# Patient Record
Sex: FEMALE | Race: WHITE | NOT HISPANIC OR LATINO | Employment: OTHER | URBAN - NONMETROPOLITAN AREA
[De-identification: names, ages, dates, MRNs, and addresses within clinical notes are randomized per-mention and may not be internally consistent; named-entity substitution may affect disease eponyms.]

---

## 2017-01-31 ENCOUNTER — HOSPITAL ENCOUNTER (EMERGENCY)
Facility: HOSPITAL | Age: 67
Discharge: HOME | End: 2017-01-31
Attending: FAMILY MEDICINE | Admitting: EMERGENCY MEDICINE

## 2017-01-31 ENCOUNTER — APPOINTMENT (OUTPATIENT)
Dept: CT IMAGING | Facility: HOSPITAL | Age: 67
End: 2017-01-31

## 2017-01-31 ENCOUNTER — APPOINTMENT (OUTPATIENT)
Dept: GENERAL RADIOLOGY | Facility: HOSPITAL | Age: 67
End: 2017-01-31
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
## 2017-01-31 DIAGNOSIS — S62.102A: Primary | ICD-10-CM

## 2017-01-31 PROCEDURE — 25010000002 MORPHINE PER 10 MG: Performed by: EMERGENCY MEDICINE

## 2017-01-31 PROCEDURE — 70450 CT HEAD/BRAIN W/O DYE: CPT

## 2017-01-31 PROCEDURE — 99284 EMERGENCY DEPT VISIT MOD MDM: CPT

## 2017-01-31 PROCEDURE — 96374 THER/PROPH/DIAG INJ IV PUSH: CPT

## 2017-01-31 PROCEDURE — 73110 X-RAY EXAM OF WRIST: CPT | Performed by: RADIOLOGY

## 2017-01-31 PROCEDURE — 71101 X-RAY EXAM UNILAT RIBS/CHEST: CPT

## 2017-01-31 PROCEDURE — 70450 CT HEAD/BRAIN W/O DYE: CPT | Performed by: RADIOLOGY

## 2017-01-31 PROCEDURE — 73110 X-RAY EXAM OF WRIST: CPT

## 2017-01-31 PROCEDURE — 25010000002 ONDANSETRON PER 1 MG: Performed by: EMERGENCY MEDICINE

## 2017-01-31 PROCEDURE — 72125 CT NECK SPINE W/O DYE: CPT

## 2017-01-31 PROCEDURE — 96375 TX/PRO/DX INJ NEW DRUG ADDON: CPT

## 2017-01-31 PROCEDURE — 72125 CT NECK SPINE W/O DYE: CPT | Performed by: RADIOLOGY

## 2017-01-31 PROCEDURE — 71101 X-RAY EXAM UNILAT RIBS/CHEST: CPT | Performed by: RADIOLOGY

## 2017-01-31 RX ORDER — HYDROCODONE BITARTRATE AND ACETAMINOPHEN 7.5; 325 MG/1; MG/1
1 TABLET ORAL EVERY 6 HOURS PRN
Qty: 15 TABLET | Refills: 0 | OUTPATIENT
Start: 2017-01-31 | End: 2019-06-03

## 2017-01-31 RX ORDER — SODIUM CHLORIDE 0.9 % (FLUSH) 0.9 %
10 SYRINGE (ML) INJECTION AS NEEDED
Status: DISCONTINUED | OUTPATIENT
Start: 2017-01-31 | End: 2017-01-31 | Stop reason: HOSPADM

## 2017-01-31 RX ORDER — HYDROCODONE BITARTRATE AND ACETAMINOPHEN 7.5; 325 MG/1; MG/1
1 TABLET ORAL ONCE
Status: COMPLETED | OUTPATIENT
Start: 2017-01-31 | End: 2017-01-31

## 2017-01-31 RX ORDER — SODIUM CHLORIDE 9 MG/ML
INJECTION, SOLUTION INTRAVENOUS
Status: COMPLETED
Start: 2017-01-31 | End: 2017-01-31

## 2017-01-31 RX ORDER — ONDANSETRON 2 MG/ML
4 INJECTION INTRAMUSCULAR; INTRAVENOUS ONCE
Status: COMPLETED | OUTPATIENT
Start: 2017-01-31 | End: 2017-01-31

## 2017-01-31 RX ADMIN — HYDROCODONE BITARTRATE AND ACETAMINOPHEN 1 TABLET: 7.5; 325 TABLET ORAL at 13:43

## 2017-01-31 RX ADMIN — ONDANSETRON 4 MG: 2 INJECTION, SOLUTION INTRAMUSCULAR; INTRAVENOUS at 12:19

## 2017-01-31 RX ADMIN — MORPHINE SULFATE 4 MG: 4 INJECTION, SOLUTION INTRAMUSCULAR; INTRAVENOUS at 12:16

## 2017-01-31 RX ADMIN — SODIUM CHLORIDE 1000 ML: 9 INJECTION, SOLUTION INTRAVENOUS at 14:18

## 2017-02-01 VITALS
WEIGHT: 130 LBS | HEART RATE: 80 BPM | SYSTOLIC BLOOD PRESSURE: 102 MMHG | RESPIRATION RATE: 18 BRPM | DIASTOLIC BLOOD PRESSURE: 58 MMHG | TEMPERATURE: 97.6 F | HEIGHT: 61 IN | OXYGEN SATURATION: 95 % | BODY MASS INDEX: 24.55 KG/M2

## 2017-02-08 ENCOUNTER — OFFICE VISIT (OUTPATIENT)
Dept: ORTHOPEDIC SURGERY | Facility: CLINIC | Age: 67
End: 2017-02-08

## 2017-02-08 VITALS
HEART RATE: 88 BPM | WEIGHT: 130 LBS | SYSTOLIC BLOOD PRESSURE: 124 MMHG | BODY MASS INDEX: 23.92 KG/M2 | HEIGHT: 62 IN | DIASTOLIC BLOOD PRESSURE: 86 MMHG

## 2017-02-08 DIAGNOSIS — S52.592A OTHER CLOSED FRACTURE OF DISTAL END OF LEFT RADIUS, INITIAL ENCOUNTER: Primary | ICD-10-CM

## 2017-02-08 PROBLEM — M25.532 LEFT WRIST PAIN: Status: ACTIVE | Noted: 2017-02-08

## 2017-02-08 PROBLEM — I10 HYPERTENSION: Status: ACTIVE | Noted: 2017-02-08

## 2017-02-08 PROBLEM — C34.90 LUNG CANCER (HCC): Status: ACTIVE | Noted: 2017-02-08

## 2017-02-08 PROBLEM — J44.9 COPD (CHRONIC OBSTRUCTIVE PULMONARY DISEASE) (HCC): Status: ACTIVE | Noted: 2017-02-08

## 2017-02-08 PROCEDURE — 25600 CLTX DST RDL FX/EPHYS SEP WO: CPT | Performed by: ORTHOPAEDIC SURGERY

## 2017-02-08 RX ORDER — FOSINOPRIL SODIUM 10 MG/1
10 TABLET ORAL DAILY
Status: ON HOLD | COMMUNITY
End: 2021-01-01

## 2017-02-08 RX ORDER — HYDROCHLOROTHIAZIDE 12.5 MG/1
12.5 CAPSULE, GELATIN COATED ORAL DAILY
Status: ON HOLD | COMMUNITY
End: 2021-01-01
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
## 2017-02-16 DIAGNOSIS — M25.532 LEFT WRIST PAIN: Primary | ICD-10-CM

## 2017-02-20 ENCOUNTER — OFFICE VISIT (OUTPATIENT)
Dept: ORTHOPEDIC SURGERY | Facility: CLINIC | Age: 67
End: 2017-02-20

## 2017-02-20 ENCOUNTER — HOSPITAL ENCOUNTER (OUTPATIENT)
Dept: GENERAL RADIOLOGY | Facility: HOSPITAL | Age: 67
Discharge: HOME | End: 2017-02-20
Attending: ORTHOPAEDIC SURGERY | Admitting: ORTHOPAEDIC SURGERY

## 2017-02-20 VITALS — BODY MASS INDEX: 24.55 KG/M2 | WEIGHT: 130 LBS | HEIGHT: 61 IN

## 2017-02-20 DIAGNOSIS — M25.532 LEFT WRIST PAIN: ICD-10-CM

## 2017-02-20 DIAGNOSIS — S52.592A OTHER CLOSED FRACTURE OF DISTAL END OF LEFT RADIUS, INITIAL ENCOUNTER: Primary | ICD-10-CM

## 2017-02-20 PROCEDURE — 73110 X-RAY EXAM OF WRIST: CPT | Performed by: RADIOLOGY

## 2017-02-20 PROCEDURE — 99024 POSTOP FOLLOW-UP VISIT: CPT | Performed by: ORTHOPAEDIC SURGERY

## 2017-02-20 PROCEDURE — 73110 X-RAY EXAM OF WRIST: CPT
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
## 2017-02-22 RX ORDER — HYDROCODONE BITARTRATE AND ACETAMINOPHEN 7.5; 325 MG/1; MG/1
1 TABLET ORAL EVERY 6 HOURS PRN
Qty: 24 TABLET | Refills: 0 | Status: ON HOLD | OUTPATIENT
Start: 2017-02-22 | End: 2021-01-01

## 2017-03-03 DIAGNOSIS — S52.592A OTHER CLOSED FRACTURE OF DISTAL END OF LEFT RADIUS, INITIAL ENCOUNTER: Primary | ICD-10-CM

## 2017-03-06 ENCOUNTER — HOSPITAL ENCOUNTER (OUTPATIENT)
Dept: GENERAL RADIOLOGY | Facility: HOSPITAL | Age: 67
Discharge: HOME | End: 2017-03-06
Attending: ORTHOPAEDIC SURGERY | Admitting: ORTHOPAEDIC SURGERY

## 2017-03-06 ENCOUNTER — OFFICE VISIT (OUTPATIENT)
Dept: ORTHOPEDIC SURGERY | Facility: CLINIC | Age: 67
End: 2017-03-06

## 2017-03-06 VITALS — WEIGHT: 130 LBS | BODY MASS INDEX: 24.55 KG/M2 | HEIGHT: 61 IN

## 2017-03-06 DIAGNOSIS — S52.592A OTHER CLOSED FRACTURE OF DISTAL END OF LEFT RADIUS, INITIAL ENCOUNTER: Primary | ICD-10-CM

## 2017-03-06 DIAGNOSIS — S52.592A OTHER CLOSED FRACTURE OF DISTAL END OF LEFT RADIUS, INITIAL ENCOUNTER: ICD-10-CM

## 2017-03-06 PROCEDURE — 99024 POSTOP FOLLOW-UP VISIT: CPT | Performed by: ORTHOPAEDIC SURGERY

## 2017-03-06 PROCEDURE — 73110 X-RAY EXAM OF WRIST: CPT

## 2017-03-06 PROCEDURE — 73110 X-RAY EXAM OF WRIST: CPT | Performed by: RADIOLOGY

## 2017-03-06 NOTE — PROGRESS NOTES
Sobeida Light   :1950    Date of encounter:2017        HPI:  Sobeida Light is a 66 y.o. female seen in follow-up left distal radius fracture, she is in short arm cast.  She is doing well with no complaints of pain today.    Exam:   Mild swelling of her hand, neurovascular intact.  No gross deformity.      Radiology:   Acceptably aligned left distal radius fracture.      Assessment: Doing well with left distal radius fracture.    ICD-10-CM ICD-9-CM   1. Other closed fracture of distal end of left radius, initial encounter S52.592A 813.42       Plan:   She is placed in forearm brace, we will reevaluate in 4 weeks' time.    Cc: JIGAR Alcantara

## 2017-03-31 DIAGNOSIS — S52.592A OTHER CLOSED FRACTURE OF DISTAL END OF LEFT RADIUS, INITIAL ENCOUNTER: Primary | ICD-10-CM

## 2017-04-05 ENCOUNTER — HOSPITAL ENCOUNTER (OUTPATIENT)
Dept: GENERAL RADIOLOGY | Facility: HOSPITAL | Age: 67
Discharge: HOME | End: 2017-04-05
Attending: ORTHOPAEDIC SURGERY | Admitting: ORTHOPAEDIC SURGERY

## 2017-04-05 ENCOUNTER — OFFICE VISIT (OUTPATIENT)
Dept: ORTHOPEDIC SURGERY | Facility: CLINIC | Age: 67
End: 2017-04-05

## 2017-04-05 VITALS — HEIGHT: 61 IN | BODY MASS INDEX: 24.55 KG/M2 | WEIGHT: 130 LBS

## 2017-04-05 DIAGNOSIS — S52.592A OTHER CLOSED FRACTURE OF DISTAL END OF LEFT RADIUS, INITIAL ENCOUNTER: ICD-10-CM

## 2017-04-05 DIAGNOSIS — S52.592A OTHER CLOSED FRACTURE OF DISTAL END OF LEFT RADIUS, INITIAL ENCOUNTER: Primary | ICD-10-CM

## 2017-04-05 PROCEDURE — 73110 X-RAY EXAM OF WRIST: CPT

## 2017-04-05 PROCEDURE — 73110 X-RAY EXAM OF WRIST: CPT | Performed by: RADIOLOGY

## 2017-04-05 PROCEDURE — 99024 POSTOP FOLLOW-UP VISIT: CPT | Performed by: ORTHOPAEDIC SURGERY
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
## 2017-06-18 ENCOUNTER — APPOINTMENT (OUTPATIENT)
Dept: CT IMAGING | Facility: HOSPITAL | Age: 67
End: 2017-06-18

## 2017-06-18 ENCOUNTER — HOSPITAL ENCOUNTER (EMERGENCY)
Facility: HOSPITAL | Age: 67
Discharge: HOME | End: 2017-06-18
Attending: EMERGENCY MEDICINE | Admitting: EMERGENCY MEDICINE

## 2017-06-18 VITALS
SYSTOLIC BLOOD PRESSURE: 131 MMHG | DIASTOLIC BLOOD PRESSURE: 77 MMHG | HEART RATE: 88 BPM | RESPIRATION RATE: 17 BRPM | HEIGHT: 62 IN | TEMPERATURE: 97.6 F | WEIGHT: 130 LBS | BODY MASS INDEX: 23.92 KG/M2 | OXYGEN SATURATION: 98 %

## 2017-06-18 DIAGNOSIS — K52.9 COLITIS: Primary | ICD-10-CM

## 2017-06-18 LAB
6-ACETYL MORPHINE: NEGATIVE
ALBUMIN SERPL-MCNC: 4.3 G/DL (ref 3.4–4.8)
ALBUMIN/GLOB SERPL: 1.2 G/DL (ref 1.5–2.5)
ALP SERPL-CCNC: 116 U/L (ref 35–104)
ALT SERPL W P-5'-P-CCNC: 16 U/L (ref 10–36)
AMPHET+METHAMPHET UR QL: NEGATIVE
AMYLASE SERPL-CCNC: 45 U/L (ref 28–100)
ANION GAP SERPL CALCULATED.3IONS-SCNC: 5.6 MMOL/L (ref 3.6–11.2)
AST SERPL-CCNC: 25 U/L (ref 10–30)
BACTERIA UR QL AUTO: ABNORMAL /HPF
BARBITURATES UR QL SCN: NEGATIVE
BASOPHILS # BLD AUTO: 0.05 10*3/MM3 (ref 0–0.3)
BASOPHILS NFR BLD AUTO: 0.5 % (ref 0–2)
BENZODIAZ UR QL SCN: POSITIVE
BILIRUB SERPL-MCNC: 0.3 MG/DL (ref 0.2–1.8)
BILIRUB UR QL STRIP: NEGATIVE
BUN BLD-MCNC: 15 MG/DL (ref 7–21)
BUN/CREAT SERPL: 13.4 (ref 7–25)
BUPRENORPHINE SERPL-MCNC: NEGATIVE NG/ML
CALCIUM SPEC-SCNC: 9.9 MG/DL (ref 7.7–10)
CANNABINOIDS SERPL QL: NEGATIVE
CHLORIDE SERPL-SCNC: 100 MMOL/L (ref 99–112)
CLARITY UR: CLEAR
CO2 SERPL-SCNC: 29.4 MMOL/L (ref 24.3–31.9)
COCAINE UR QL: NEGATIVE
COLOR UR: YELLOW
CREAT BLD-MCNC: 1.12 MG/DL (ref 0.43–1.29)
D-LACTATE SERPL-SCNC: 1.4 MMOL/L (ref 0.5–2)
DEPRECATED RDW RBC AUTO: 49.1 FL (ref 37–54)
EOSINOPHIL # BLD AUTO: 0.16 10*3/MM3 (ref 0–0.7)
EOSINOPHIL NFR BLD AUTO: 1.7 % (ref 0–7)
ERYTHROCYTE [DISTWIDTH] IN BLOOD BY AUTOMATED COUNT: 14.5 % (ref 11.5–14.5)
GFR SERPL CREATININE-BSD FRML MDRD: 49 ML/MIN/1.73
GLOBULIN UR ELPH-MCNC: 3.7 GM/DL
GLUCOSE BLD-MCNC: 111 MG/DL (ref 70–110)
GLUCOSE UR STRIP-MCNC: NEGATIVE MG/DL
HCT VFR BLD AUTO: 45.5 % (ref 37–47)
HGB BLD-MCNC: 14.6 G/DL (ref 12–16)
HGB UR QL STRIP.AUTO: NEGATIVE
HYALINE CASTS UR QL AUTO: ABNORMAL /LPF
IMM GRANULOCYTES # BLD: 0.02 10*3/MM3 (ref 0–0.03)
IMM GRANULOCYTES NFR BLD: 0.2 % (ref 0–0.5)
KETONES UR QL STRIP: NEGATIVE
LEUKOCYTE ESTERASE UR QL STRIP.AUTO: ABNORMAL
LIPASE SERPL-CCNC: 37 U/L (ref 13–60)
LYMPHOCYTES # BLD AUTO: 1.5 10*3/MM3 (ref 1–3)
LYMPHOCYTES NFR BLD AUTO: 16.1 % (ref 16–46)
MCH RBC QN AUTO: 31 PG (ref 27–33)
MCHC RBC AUTO-ENTMCNC: 32.1 G/DL (ref 33–37)
MCV RBC AUTO: 96.6 FL (ref 80–94)
MDMA UR QL SCN: NEGATIVE
METHADONE UR QL SCN: NEGATIVE
MONOCYTES # BLD AUTO: 0.94 10*3/MM3 (ref 0.1–0.9)
MONOCYTES NFR BLD AUTO: 10.1 % (ref 0–12)
NEUTROPHILS # BLD AUTO: 6.67 10*3/MM3 (ref 1.4–6.5)
NEUTROPHILS NFR BLD AUTO: 71.4 % (ref 40–75)
NITRITE UR QL STRIP: NEGATIVE
OPIATES UR QL: POSITIVE
OSMOLALITY SERPL CALC.SUM OF ELEC: 271.6 MOSM/KG (ref 273–305)
OXYCODONE UR QL SCN: NEGATIVE
PCP UR QL SCN: NEGATIVE
PH UR STRIP.AUTO: 5.5 [PH] (ref 5–8)
PLATELET # BLD AUTO: 286 10*3/MM3 (ref 130–400)
PMV BLD AUTO: 10.3 FL (ref 6–10)
POTASSIUM BLD-SCNC: 3.7 MMOL/L (ref 3.5–5.3)
PROT SERPL-MCNC: 8 G/DL (ref 6–8)
PROT UR QL STRIP: NEGATIVE
RBC # BLD AUTO: 4.71 10*6/MM3 (ref 4.2–5.4)
RBC # UR: ABNORMAL /HPF
REF LAB TEST METHOD: ABNORMAL
SODIUM BLD-SCNC: 135 MMOL/L (ref 135–153)
SP GR UR STRIP: 1.01 (ref 1–1.03)
SQUAMOUS #/AREA URNS HPF: ABNORMAL /HPF
UROBILINOGEN UR QL STRIP: ABNORMAL
WBC NRBC COR # BLD: 9.34 10*3/MM3 (ref 4.5–12.5)
WBC UR QL AUTO: ABNORMAL /HPF

## 2017-06-18 PROCEDURE — 80307 DRUG TEST PRSMV CHEM ANLYZR: CPT | Performed by: NURSE PRACTITIONER

## 2017-06-18 PROCEDURE — 0 IOPAMIDOL PER 1 ML: Performed by: EMERGENCY MEDICINE

## 2017-06-18 PROCEDURE — 82150 ASSAY OF AMYLASE: CPT | Performed by: NURSE PRACTITIONER

## 2017-06-18 PROCEDURE — 80053 COMPREHEN METABOLIC PANEL: CPT | Performed by: NURSE PRACTITIONER

## 2017-06-18 PROCEDURE — 74177 CT ABD & PELVIS W/CONTRAST: CPT | Performed by: RADIOLOGY

## 2017-06-18 PROCEDURE — 25010000002 ONDANSETRON PER 1 MG: Performed by: NURSE PRACTITIONER

## 2017-06-18 PROCEDURE — 74177 CT ABD & PELVIS W/CONTRAST: CPT

## 2017-06-18 PROCEDURE — 83690 ASSAY OF LIPASE: CPT | Performed by: NURSE PRACTITIONER

## 2017-06-18 PROCEDURE — 96374 THER/PROPH/DIAG INJ IV PUSH: CPT

## 2017-06-18 PROCEDURE — 99283 EMERGENCY DEPT VISIT LOW MDM: CPT

## 2017-06-18 PROCEDURE — 85025 COMPLETE CBC W/AUTO DIFF WBC: CPT | Performed by: NURSE PRACTITIONER

## 2017-06-18 PROCEDURE — 83605 ASSAY OF LACTIC ACID: CPT | Performed by: NURSE PRACTITIONER

## 2017-06-18 PROCEDURE — 96361 HYDRATE IV INFUSION ADD-ON: CPT

## 2017-06-18 PROCEDURE — 81001 URINALYSIS AUTO W/SCOPE: CPT | Performed by: NURSE PRACTITIONER

## 2017-06-18 RX ORDER — CIPROFLOXACIN 500 MG/1
500 TABLET, FILM COATED ORAL ONCE
Status: COMPLETED | OUTPATIENT
Start: 2017-06-18 | End: 2017-06-18

## 2017-06-18 RX ORDER — METRONIDAZOLE 250 MG/1
500 TABLET ORAL ONCE
Status: COMPLETED | OUTPATIENT
Start: 2017-06-18 | End: 2017-06-18

## 2017-06-18 RX ORDER — ONDANSETRON 2 MG/ML
4 INJECTION INTRAMUSCULAR; INTRAVENOUS ONCE
Status: COMPLETED | OUTPATIENT
Start: 2017-06-18 | End: 2017-06-18

## 2017-06-18 RX ORDER — METRONIDAZOLE 500 MG/1
500 TABLET ORAL 3 TIMES DAILY
Qty: 30 TABLET | Refills: 0 | Status: SHIPPED | OUTPATIENT
Start: 2017-06-18 | End: 2017-06-28

## 2017-06-18 RX ORDER — SODIUM CHLORIDE 0.9 % (FLUSH) 0.9 %
10 SYRINGE (ML) INJECTION AS NEEDED
Status: DISCONTINUED | OUTPATIENT
Start: 2017-06-18 | End: 2017-06-18 | Stop reason: HOSPADM

## 2017-06-18 RX ORDER — CIPROFLOXACIN 500 MG/1
500 TABLET, FILM COATED ORAL 2 TIMES DAILY
Qty: 20 TABLET | Refills: 0 | Status: SHIPPED | OUTPATIENT
Start: 2017-06-18 | End: 2017-06-28

## 2017-06-18 RX ORDER — ONDANSETRON 4 MG/1
4 TABLET, ORALLY DISINTEGRATING ORAL EVERY 6 HOURS PRN
Qty: 15 TABLET | Refills: 0 | Status: ON HOLD | OUTPATIENT
Start: 2017-06-18 | End: 2021-01-01

## 2017-06-18 RX ADMIN — METRONIDAZOLE 500 MG: 250 TABLET ORAL at 17:59

## 2017-06-18 RX ADMIN — SODIUM CHLORIDE 1000 ML: 9 INJECTION, SOLUTION INTRAVENOUS at 16:27

## 2017-06-18 RX ADMIN — IOPAMIDOL 100 ML: 755 INJECTION, SOLUTION INTRAVENOUS at 17:10

## 2017-06-18 RX ADMIN — CIPROFLOXACIN 500 MG: 500 TABLET, FILM COATED ORAL at 17:59

## 2017-06-18 RX ADMIN — ONDANSETRON 4 MG: 2 INJECTION, SOLUTION INTRAMUSCULAR; INTRAVENOUS at 16:27

## 2017-06-18 NOTE — DISCHARGE INSTRUCTIONS

## 2017-06-19 NOTE — ED PROVIDER NOTES
Subjective   Patient is a 67 y.o. female presenting with abdominal pain.   History provided by:  Patient  Abdominal Pain   Pain location:  Generalized  Pain quality: sharp and shooting    Pain radiates to:  Does not radiate  Pain severity:  Moderate  Onset quality:  Sudden  Timing:  Constant  Progression:  Worsening  Chronicity:  New  Relieved by:  None tried  Worsened by:  Nothing  Ineffective treatments:  None tried  Associated symptoms: nausea and vomiting    Associated symptoms: no chest pain, no dysuria and no fever        Review of Systems   Constitutional: Negative.  Negative for fever.   HENT: Negative.    Respiratory: Negative.    Cardiovascular: Negative.  Negative for chest pain.   Gastrointestinal: Positive for abdominal pain, nausea and vomiting.   Endocrine: Negative.    Genitourinary: Negative.  Negative for dysuria.   Skin: Negative.    Neurological: Negative.    Psychiatric/Behavioral: Negative.    All other systems reviewed and are negative.      Past Medical History:   Diagnosis Date   • Ankle fracture    • Anxiety    • COPD (chronic obstructive pulmonary disease)    • Hypertension    • Lung cancer    • Wrist fracture        Allergies   Allergen Reactions   • Azithromycin        Past Surgical History:   Procedure Laterality Date   • ANKLE SURGERY     • CHOLECYSTECTOMY     • TONSILLECTOMY         History reviewed. No pertinent family history.    Social History     Social History   • Marital status:      Spouse name: N/A   • Number of children: N/A   • Years of education: N/A     Social History Main Topics   • Smoking status: Current Every Day Smoker     Packs/day: 0.50     Types: Cigarettes   • Smokeless tobacco: Never Used   • Alcohol use No   • Drug use: No   • Sexual activity: Defer     Other Topics Concern   • None     Social History Narrative           Objective   Physical Exam   Constitutional: She is oriented to person, place, and time. She appears well-developed and well-nourished.  No distress.   HENT:   Head: Normocephalic and atraumatic.   Right Ear: External ear normal.   Left Ear: External ear normal.   Nose: Nose normal.   Eyes: Conjunctivae and EOM are normal. Pupils are equal, round, and reactive to light.   Neck: Normal range of motion. Neck supple. No JVD present. No tracheal deviation present.   Cardiovascular: Normal rate, regular rhythm and normal heart sounds.    No murmur heard.  Pulmonary/Chest: Effort normal and breath sounds normal. No respiratory distress. She has no wheezes.   Abdominal: Soft. Bowel sounds are normal. There is generalized tenderness.   Musculoskeletal: Normal range of motion. She exhibits no edema or deformity.   Neurological: She is alert and oriented to person, place, and time. No cranial nerve deficit.   Skin: Skin is warm and dry. No rash noted. She is not diaphoretic. No erythema. No pallor.   Psychiatric: She has a normal mood and affect. Her behavior is normal. Thought content normal.   Nursing note and vitals reviewed.      Procedures         ED Course  ED Course                  MDM  Number of Diagnoses or Management Options  Colitis: new and requires workup     Amount and/or Complexity of Data Reviewed  Clinical lab tests: ordered and reviewed  Tests in the radiology section of CPT®: ordered and reviewed    Risk of Complications, Morbidity, and/or Mortality  Presenting problems: moderate  Diagnostic procedures: low  Management options: low    Patient Progress  Patient progress: improved      Final diagnoses:   Colitis            Lora Stephenson, JIGAR  06/19/17 0248

## 2019-06-03 ENCOUNTER — HOSPITAL ENCOUNTER (EMERGENCY)
Facility: HOSPITAL | Age: 69
Discharge: HOME | End: 2019-06-03
Attending: EMERGENCY MEDICINE | Admitting: EMERGENCY MEDICINE

## 2019-06-03 VITALS
BODY MASS INDEX: 26.68 KG/M2 | WEIGHT: 145 LBS | RESPIRATION RATE: 18 BRPM | OXYGEN SATURATION: 97 % | HEART RATE: 60 BPM | HEIGHT: 62 IN | SYSTOLIC BLOOD PRESSURE: 169 MMHG | DIASTOLIC BLOOD PRESSURE: 77 MMHG | TEMPERATURE: 97.9 F

## 2019-06-03 DIAGNOSIS — K04.7 DENTAL ABSCESS: Primary | ICD-10-CM

## 2019-06-03 PROCEDURE — 99283 EMERGENCY DEPT VISIT LOW MDM: CPT

## 2019-06-03 RX ORDER — KETOROLAC TROMETHAMINE 10 MG/1
10 TABLET, FILM COATED ORAL ONCE
Status: COMPLETED | OUTPATIENT
Start: 2019-06-03 | End: 2019-06-03

## 2019-06-03 RX ORDER — DICLOFENAC SODIUM 75 MG/1
75 TABLET, DELAYED RELEASE ORAL 2 TIMES DAILY PRN
Qty: 20 TABLET | Refills: 0 | Status: ON HOLD | OUTPATIENT
Start: 2019-06-03 | End: 2021-01-01

## 2019-06-03 RX ORDER — PENICILLIN V POTASSIUM 500 MG/1
500 TABLET ORAL 4 TIMES DAILY
Qty: 28 TABLET | Refills: 0 | Status: ON HOLD | OUTPATIENT
Start: 2019-06-03 | End: 2021-01-01

## 2019-06-03 RX ORDER — PENICILLIN V POTASSIUM 250 MG/1
500 TABLET ORAL ONCE
Status: COMPLETED | OUTPATIENT
Start: 2019-06-03 | End: 2019-06-03

## 2019-06-03 RX ADMIN — PENICILLIN V POTASSIUM 500 MG: 250 TABLET ORAL at 06:18

## 2019-06-03 RX ADMIN — BENZOCAINE: 200 LIQUID DENTAL; ORAL; PERIODONTAL at 06:18

## 2019-06-03 RX ADMIN — KETOROLAC TROMETHAMINE 10 MG: 10 TABLET, FILM COATED ORAL at 06:18

## 2019-06-03 NOTE — ED PROVIDER NOTES
Subjective     History provided by:  Patient   used: No    Dental Pain   Location:  Lower  Lower teeth location:  19/LL 1st molar  Quality:  Aching and pulsating  Severity:  Moderate  Onset quality:  Gradual  Timing:  Intermittent  Progression:  Waxing and waning  Chronicity:  New  Context: abscess and poor dentition    Relieved by:  Nothing  Worsened by:  Nothing  Ineffective treatments:  None tried  Associated symptoms: facial pain and gum swelling    Risk factors: lack of dental care, periodontal disease and smoking        Review of Systems   Constitutional: Negative.    HENT: Positive for dental problem.    Eyes: Negative.    Respiratory: Negative.    Cardiovascular: Negative.    Gastrointestinal: Negative.    Endocrine: Negative.    Genitourinary: Negative.    Musculoskeletal: Negative.    Skin: Negative.    Allergic/Immunologic: Negative.    Neurological: Negative.    Hematological: Negative.    Psychiatric/Behavioral: Negative.    All other systems reviewed and are negative.      Past Medical History:   Diagnosis Date   • Ankle fracture    • Anxiety    • COPD (chronic obstructive pulmonary disease) (CMS/[facility])    • Hypertension    • Lung cancer (CMS/[facility])    • Wrist fracture        Allergies   Allergen Reactions   • Azithromycin        Past Surgical History:   Procedure Laterality Date   • ANKLE SURGERY     • CHOLECYSTECTOMY     • TONSILLECTOMY         History reviewed. No pertinent family history.    Social History     Socioeconomic History   • Marital status:      Spouse name: Not on file   • Number of children: Not on file   • Years of education: Not on file   • Highest education level: Not on file   Tobacco Use   • Smoking status: Current Every Day Smoker     Packs/day: 0.50     Types: Cigarettes   • Smokeless tobacco: Never Used   Substance and Sexual Activity   • Alcohol use: No   • Drug use: No   • Sexual activity: Defer           Objective   Physical Exam   Constitutional: She  is oriented to person, place, and time. She appears well-developed and well-nourished.   HENT:   Head: Normocephalic.   Mouth/Throat: Oropharynx is clear and moist. Dental abscesses and dental caries present.       Eyes: Pupils are equal, round, and reactive to light.   Neck: Normal range of motion.   Cardiovascular: Normal rate, regular rhythm, normal heart sounds and intact distal pulses.   Pulmonary/Chest: Effort normal and breath sounds normal.   Abdominal: Soft. Bowel sounds are normal.   Musculoskeletal: Normal range of motion.   Neurological: She is alert and oriented to person, place, and time.   Skin: Skin is warm. Capillary refill takes less than 2 seconds.   Psychiatric: She has a normal mood and affect. Her behavior is normal. Judgment and thought content normal.   Nursing note and vitals reviewed.      Procedures           ED Course                  MDM  Number of Diagnoses or Management Options  Dental abscess: new and does not require workup  Risk of Complications, Morbidity, and/or Mortality  Presenting problems: low  Diagnostic procedures: low  Management options: low    Patient Progress  Patient progress: improved        Final diagnoses:   Dental abscess            Cathy Ibanez, APRN  06/03/19 0617

## 2020-01-01 ENCOUNTER — APPOINTMENT (OUTPATIENT)
Dept: CT IMAGING | Facility: HOSPITAL | Age: 70
End: 2020-01-01

## 2020-01-01 ENCOUNTER — HOSPITAL ENCOUNTER (EMERGENCY)
Facility: HOSPITAL | Age: 70
Discharge: HOME | End: 2020-11-17
Attending: FAMILY MEDICINE | Admitting: FAMILY MEDICINE

## 2020-01-01 VITALS
RESPIRATION RATE: 18 BRPM | WEIGHT: 148 LBS | BODY MASS INDEX: 27.23 KG/M2 | HEART RATE: 70 BPM | OXYGEN SATURATION: 98 % | SYSTOLIC BLOOD PRESSURE: 143 MMHG | TEMPERATURE: 98 F | HEIGHT: 62 IN | DIASTOLIC BLOOD PRESSURE: 77 MMHG

## 2020-01-01 DIAGNOSIS — R23.8 SCALP IRRITATION: ICD-10-CM

## 2020-01-01 DIAGNOSIS — T88.7XXA MEDICATION SIDE EFFECTS: Primary | ICD-10-CM

## 2020-01-01 LAB
6-ACETYL MORPHINE: NEGATIVE
ALBUMIN SERPL-MCNC: 4.88 G/DL (ref 3.5–5.2)
ALBUMIN/GLOB SERPL: 1.2 G/DL
ALP SERPL-CCNC: 139 U/L (ref 39–117)
ALT SERPL W P-5'-P-CCNC: 14 U/L (ref 1–33)
AMPHET+METHAMPHET UR QL: NEGATIVE
ANION GAP SERPL CALCULATED.3IONS-SCNC: 11.6 MMOL/L (ref 5–15)
AST SERPL-CCNC: 19 U/L (ref 1–32)
B PARAPERT DNA SPEC QL NAA+PROBE: NOT DETECTED
B PERT DNA SPEC QL NAA+PROBE: NOT DETECTED
BACTERIA UR QL AUTO: ABNORMAL /HPF
BARBITURATES UR QL SCN: NEGATIVE
BASOPHILS # BLD AUTO: 0.1 10*3/MM3 (ref 0–0.2)
BASOPHILS NFR BLD AUTO: 1.1 % (ref 0–1.5)
BENZODIAZ UR QL SCN: POSITIVE
BILIRUB SERPL-MCNC: 0.4 MG/DL (ref 0–1.2)
BILIRUB UR QL STRIP: NEGATIVE
BUN SERPL-MCNC: 13 MG/DL (ref 8–23)
BUN/CREAT SERPL: 13.7 (ref 7–25)
BUPRENORPHINE SERPL-MCNC: NEGATIVE NG/ML
C PNEUM DNA NPH QL NAA+NON-PROBE: NOT DETECTED
CALCIUM SPEC-SCNC: 10.1 MG/DL (ref 8.6–10.5)
CANNABINOIDS SERPL QL: NEGATIVE
CHLORIDE SERPL-SCNC: 99 MMOL/L (ref 98–107)
CLARITY UR: CLEAR
CO2 SERPL-SCNC: 28.4 MMOL/L (ref 22–29)
COCAINE UR QL: NEGATIVE
COLOR UR: YELLOW
CREAT SERPL-MCNC: 0.95 MG/DL (ref 0.57–1)
CRP SERPL-MCNC: 1.29 MG/DL (ref 0–0.5)
DEPRECATED RDW RBC AUTO: 50.5 FL (ref 37–54)
EOSINOPHIL # BLD AUTO: 0.43 10*3/MM3 (ref 0–0.4)
EOSINOPHIL NFR BLD AUTO: 4.8 % (ref 0.3–6.2)
ERYTHROCYTE [DISTWIDTH] IN BLOOD BY AUTOMATED COUNT: 14.6 % (ref 12.3–15.4)
ETHANOL BLD-MCNC: <10 MG/DL (ref 0–10)
ETHANOL UR QL: <0.01 %
FLUAV H1 2009 PAND RNA NPH QL NAA+PROBE: NOT DETECTED
FLUAV H1 HA GENE NPH QL NAA+PROBE: NOT DETECTED
FLUAV H3 RNA NPH QL NAA+PROBE: NOT DETECTED
FLUAV SUBTYP SPEC NAA+PROBE: NOT DETECTED
FLUBV RNA ISLT QL NAA+PROBE: NOT DETECTED
GFR SERPL CREATININE-BSD FRML MDRD: 58 ML/MIN/1.73
GLOBULIN UR ELPH-MCNC: 4.2 GM/DL
GLUCOSE SERPL-MCNC: 91 MG/DL (ref 65–99)
GLUCOSE UR STRIP-MCNC: NEGATIVE MG/DL
HADV DNA SPEC NAA+PROBE: NOT DETECTED
HCOV 229E RNA SPEC QL NAA+PROBE: NOT DETECTED
HCOV HKU1 RNA SPEC QL NAA+PROBE: NOT DETECTED
HCOV NL63 RNA SPEC QL NAA+PROBE: NOT DETECTED
HCOV OC43 RNA SPEC QL NAA+PROBE: NOT DETECTED
HCT VFR BLD AUTO: 53 % (ref 34–46.6)
HGB BLD-MCNC: 16.7 G/DL (ref 12–15.9)
HGB UR QL STRIP.AUTO: NEGATIVE
HMPV RNA NPH QL NAA+NON-PROBE: NOT DETECTED
HOLD SPECIMEN: NORMAL
HOLD SPECIMEN: NORMAL
HPIV1 RNA SPEC QL NAA+PROBE: NOT DETECTED
HPIV2 RNA SPEC QL NAA+PROBE: NOT DETECTED
HPIV3 RNA NPH QL NAA+PROBE: NOT DETECTED
HPIV4 P GENE NPH QL NAA+PROBE: NOT DETECTED
HYALINE CASTS UR QL AUTO: ABNORMAL /LPF
IMM GRANULOCYTES # BLD AUTO: 0.03 10*3/MM3 (ref 0–0.05)
IMM GRANULOCYTES NFR BLD AUTO: 0.3 % (ref 0–0.5)
KETONES UR QL STRIP: NEGATIVE
LEUKOCYTE ESTERASE UR QL STRIP.AUTO: ABNORMAL
LYMPHOCYTES # BLD AUTO: 1.68 10*3/MM3 (ref 0.7–3.1)
LYMPHOCYTES NFR BLD AUTO: 18.6 % (ref 19.6–45.3)
M PNEUMO IGG SER IA-ACNC: NOT DETECTED
MAGNESIUM SERPL-MCNC: 2 MG/DL (ref 1.6–2.4)
MCH RBC QN AUTO: 29.5 PG (ref 26.6–33)
MCHC RBC AUTO-ENTMCNC: 31.5 G/DL (ref 31.5–35.7)
MCV RBC AUTO: 93.6 FL (ref 79–97)
METHADONE UR QL SCN: NEGATIVE
MONOCYTES # BLD AUTO: 0.78 10*3/MM3 (ref 0.1–0.9)
MONOCYTES NFR BLD AUTO: 8.6 % (ref 5–12)
NEUTROPHILS NFR BLD AUTO: 6 10*3/MM3 (ref 1.7–7)
NEUTROPHILS NFR BLD AUTO: 66.6 % (ref 42.7–76)
NITRITE UR QL STRIP: NEGATIVE
NRBC BLD AUTO-RTO: 0 /100 WBC (ref 0–0.2)
NT-PROBNP SERPL-MCNC: 525.9 PG/ML (ref 0–900)
OPIATES UR QL: POSITIVE
OXYCODONE UR QL SCN: NEGATIVE
PCP UR QL SCN: NEGATIVE
PH UR STRIP.AUTO: 6 [PH] (ref 5–8)
PLATELET # BLD AUTO: 364 10*3/MM3 (ref 140–450)
PMV BLD AUTO: 11.5 FL (ref 6–12)
POTASSIUM SERPL-SCNC: 3.9 MMOL/L (ref 3.5–5.2)
PROT SERPL-MCNC: 9.1 G/DL (ref 6–8.5)
PROT UR QL STRIP: NEGATIVE
QT INTERVAL: 374 MS
QTC INTERVAL: 400 MS
RBC # BLD AUTO: 5.66 10*6/MM3 (ref 3.77–5.28)
RBC # UR: ABNORMAL /HPF
REF LAB TEST METHOD: ABNORMAL
RHINOVIRUS RNA SPEC NAA+PROBE: NOT DETECTED
RSV RNA NPH QL NAA+NON-PROBE: NOT DETECTED
SODIUM SERPL-SCNC: 139 MMOL/L (ref 136–145)
SP GR UR STRIP: 1.01 (ref 1–1.03)
SQUAMOUS #/AREA URNS HPF: ABNORMAL /HPF
T4 FREE SERPL-MCNC: 1.1 NG/DL (ref 0.93–1.7)
TROPONIN T SERPL-MCNC: <0.01 NG/ML (ref 0–0.03)
TSH SERPL DL<=0.05 MIU/L-ACNC: 3.91 UIU/ML (ref 0.27–4.2)
UROBILINOGEN UR QL STRIP: ABNORMAL
WBC # BLD AUTO: 9.02 10*3/MM3 (ref 3.4–10.8)
WBC UR QL AUTO: ABNORMAL /HPF
WHOLE BLOOD HOLD SPECIMEN: NORMAL
WHOLE BLOOD HOLD SPECIMEN: NORMAL

## 2020-01-01 PROCEDURE — 86140 C-REACTIVE PROTEIN: CPT | Performed by: FAMILY MEDICINE

## 2020-01-01 PROCEDURE — 0100U HC BIOFIRE FILMARRAY RESP PANEL 2: CPT | Performed by: FAMILY MEDICINE

## 2020-01-01 PROCEDURE — 84443 ASSAY THYROID STIM HORMONE: CPT | Performed by: FAMILY MEDICINE

## 2020-01-01 PROCEDURE — 80307 DRUG TEST PRSMV CHEM ANLYZR: CPT | Performed by: FAMILY MEDICINE

## 2020-01-01 PROCEDURE — 81001 URINALYSIS AUTO W/SCOPE: CPT | Performed by: FAMILY MEDICINE

## 2020-01-01 PROCEDURE — 80053 COMPREHEN METABOLIC PANEL: CPT | Performed by: FAMILY MEDICINE

## 2020-01-01 PROCEDURE — 93010 ELECTROCARDIOGRAM REPORT: CPT | Performed by: INTERNAL MEDICINE

## 2020-01-01 PROCEDURE — 85025 COMPLETE CBC W/AUTO DIFF WBC: CPT | Performed by: FAMILY MEDICINE

## 2020-01-01 PROCEDURE — 83880 ASSAY OF NATRIURETIC PEPTIDE: CPT | Performed by: FAMILY MEDICINE

## 2020-01-01 PROCEDURE — 84484 ASSAY OF TROPONIN QUANT: CPT | Performed by: FAMILY MEDICINE

## 2020-01-01 PROCEDURE — 99284 EMERGENCY DEPT VISIT MOD MDM: CPT

## 2020-01-01 PROCEDURE — 93005 ELECTROCARDIOGRAM TRACING: CPT | Performed by: FAMILY MEDICINE

## 2020-01-01 PROCEDURE — 83735 ASSAY OF MAGNESIUM: CPT | Performed by: FAMILY MEDICINE

## 2020-01-01 PROCEDURE — 70490 CT SOFT TISSUE NECK W/O DYE: CPT | Performed by: RADIOLOGY

## 2020-01-01 PROCEDURE — 84439 ASSAY OF FREE THYROXINE: CPT | Performed by: FAMILY MEDICINE

## 2020-01-01 PROCEDURE — 70490 CT SOFT TISSUE NECK W/O DYE: CPT

## 2020-01-01 PROCEDURE — 70450 CT HEAD/BRAIN W/O DYE: CPT

## 2020-01-01 PROCEDURE — 70450 CT HEAD/BRAIN W/O DYE: CPT | Performed by: RADIOLOGY

## 2020-01-01 RX ORDER — DOXYCYCLINE 100 MG/1
100 CAPSULE ORAL 2 TIMES DAILY
Qty: 20 CAPSULE | Refills: 0 | Status: SHIPPED | OUTPATIENT
Start: 2020-01-01 | End: 2020-01-01 | Stop reason: SDUPTHER

## 2020-01-01 RX ORDER — DOXYCYCLINE 100 MG/1
100 CAPSULE ORAL 2 TIMES DAILY
Qty: 20 CAPSULE | Refills: 0 | Status: SHIPPED | OUTPATIENT
Start: 2020-01-01 | End: 2021-01-01

## 2020-01-01 RX ORDER — SODIUM CHLORIDE 0.9 % (FLUSH) 0.9 %
10 SYRINGE (ML) INJECTION AS NEEDED
Status: DISCONTINUED | OUTPATIENT
Start: 2020-01-01 | End: 2020-01-01 | Stop reason: HOSPADM

## 2020-11-17 NOTE — ED PROVIDER NOTES
Subjective   Patient is a 70-year-old female who presents the emergency department complaining of boils on the top of her head.  The patient states that she is on chemotherapy and was told by the chemotherapy developers that this can happen.  The patient states that some of them have ruptured and leaked.  The patient states that she has also felt slightly weak and fatigued over the last few days.  The patient is being treated for history of lung cancer.  She last had her chemotherapy infusion 1 month ago.  The patient states that she has not had any fever but has had chills.  She states that she may have been exposed to someone with Covid but is not 100% sure.  The patient denies any objective fevers at this time.  She denies any headache, nausea, vomiting or additional symptoms.          Review of Systems   Constitutional: Positive for activity change, appetite change, chills and fatigue. Negative for diaphoresis and fever.   HENT: Negative for congestion, postnasal drip, rhinorrhea, sinus pressure, sinus pain, sneezing, sore throat and tinnitus.    Eyes: Negative for discharge and itching.   Respiratory: Negative for apnea, cough, choking, chest tightness, shortness of breath and wheezing.    Cardiovascular: Negative for chest pain, palpitations and leg swelling.   Gastrointestinal: Negative for abdominal distention, abdominal pain, constipation, diarrhea, nausea and vomiting.   Genitourinary: Negative for difficulty urinating and flank pain.   Musculoskeletal: Negative for arthralgias and back pain.   Neurological: Negative for dizziness and light-headedness.   Psychiatric/Behavioral: Negative for agitation and decreased concentration.       Past Medical History:   Diagnosis Date   • Ankle fracture    • Anxiety    • COPD (chronic obstructive pulmonary disease) (CMS/[facility])    • Hypertension    • Lung cancer (CMS/[facility])    • Wrist fracture        Allergies   Allergen Reactions   • Azithromycin        Past Surgical  History:   Procedure Laterality Date   • ANKLE SURGERY     • CHOLECYSTECTOMY     • TONSILLECTOMY         No family history on file.    Social History     Socioeconomic History   • Marital status:      Spouse name: Not on file   • Number of children: Not on file   • Years of education: Not on file   • Highest education level: Not on file   Tobacco Use   • Smoking status: Current Every Day Smoker     Packs/day: 0.50     Types: Cigarettes   • Smokeless tobacco: Never Used   Substance and Sexual Activity   • Alcohol use: No   • Drug use: No   • Sexual activity: Defer           Objective   Physical Exam  Vitals signs and nursing note reviewed.   Constitutional:       General: She is not in acute distress.     Appearance: She is well-developed and normal weight. She is not ill-appearing, toxic-appearing or diaphoretic.      Interventions: She is not intubated.  HENT:      Head: Normocephalic.      Comments: Scattered small abscesses/scabs on scalp.  No signs of erythema or surrounding cellulitis.  No drainage on any of these lesions.     Mouth/Throat:      Mouth: Mucous membranes are moist.      Pharynx: No pharyngeal swelling or oropharyngeal exudate.   Eyes:      Pupils: Pupils are equal, round, and reactive to light.   Neck:      Musculoskeletal: Normal range of motion and neck supple.      Thyroid: No thyromegaly.      Vascular: No hepatojugular reflux or JVD.      Trachea: No tracheal deviation.   Cardiovascular:      Rate and Rhythm: Normal rate and regular rhythm.  No extrasystoles are present.     Pulses: No decreased pulses.      Heart sounds: No murmur. No friction rub. No gallop.    Pulmonary:      Effort: Pulmonary effort is normal. No tachypnea, bradypnea, accessory muscle usage or respiratory distress. She is not intubated.      Breath sounds: Normal breath sounds. No stridor. No decreased breath sounds, wheezing, rhonchi or rales.   Chest:      Chest wall: No mass, deformity, tenderness or crepitus.    Musculoskeletal:      Right lower leg: She exhibits no tenderness. No edema.      Left lower leg: She exhibits no tenderness. No edema.   Lymphadenopathy:      Cervical: No cervical adenopathy.   Skin:     General: Skin is warm and dry.      Capillary Refill: Capillary refill takes less than 2 seconds.      Coloration: Skin is not cyanotic or pale.      Findings: No ecchymosis, erythema or rash.      Nails: There is no clubbing.     Neurological:      Mental Status: She is alert.      Cranial Nerves: No cranial nerve deficit.      Motor: No weakness.   Psychiatric:         Mood and Affect: Mood normal. Mood is not anxious.         Behavior: Behavior normal. Behavior is not agitated.         Procedures           ED Course  ED Course as of Nov 17 1655   Tue Nov 17, 2020   1436 EKG is normal sinus rhythm with biatrial enlargement rate 69 bpm W[place] interval 120 ms artifact present making this EKG and was nondiagnostic.  QTc 400 ms    [EG]      ED Course User Index  [EG] Irene Conroy DO                                           MDM  Number of Diagnoses or Management Options  Medication side effects: new and requires workup  Scalp irritation: new and requires workup     Amount and/or Complexity of Data Reviewed  Clinical lab tests: ordered and reviewed  Tests in the radiology section of CPT®: ordered and reviewed  Tests in the medicine section of CPT®: ordered and reviewed  Independent visualization of images, tracings, or specimens: yes    Risk of Complications, Morbidity, and/or Mortality  Presenting problems: moderate  Diagnostic procedures: moderate  Management options: moderate    Patient Progress  Patient progress: stable      Final diagnoses:   Medication side effects   Scalp irritation            Irene Conroy DO  11/17/20 1133

## 2021-01-01 ENCOUNTER — HOSPITAL ENCOUNTER (OUTPATIENT)
Dept: PET IMAGING | Facility: HOSPITAL | Age: 71
Discharge: HOME | End: 2021-08-20
Admitting: INTERNAL MEDICINE

## 2021-01-01 ENCOUNTER — HOSPITAL ENCOUNTER (OUTPATIENT)
Dept: CT IMAGING | Facility: HOSPITAL | Age: 71
Discharge: HOME | End: 2021-07-06

## 2021-01-01 ENCOUNTER — HOSPITAL ENCOUNTER (OUTPATIENT)
Dept: RADIATION ONCOLOGY | Facility: HOSPITAL | Age: 71
Setting detail: RADIATION/ONCOLOGY SERIES
Discharge: HOME | End: 2021-09-23

## 2021-01-01 ENCOUNTER — LAB (OUTPATIENT)
Dept: LAB | Facility: HOSPITAL | Age: 71
End: 2021-01-01

## 2021-01-01 ENCOUNTER — NURSE TRIAGE (OUTPATIENT)
Dept: CALL CENTER | Facility: HOSPITAL | Age: 71
End: 2021-01-01

## 2021-01-01 ENCOUNTER — TELEPHONE (OUTPATIENT)
Dept: ONCOLOGY | Facility: CLINIC | Age: 71
End: 2021-01-01

## 2021-01-01 ENCOUNTER — APPOINTMENT (OUTPATIENT)
Dept: CT IMAGING | Facility: HOSPITAL | Age: 71
End: 2021-01-01

## 2021-01-01 ENCOUNTER — TRANSCRIBE ORDERS (OUTPATIENT)
Dept: ADMINISTRATIVE | Facility: HOSPITAL | Age: 71
End: 2021-01-01

## 2021-01-01 ENCOUNTER — TELEPHONE (OUTPATIENT)
Dept: ONCOLOGY | Facility: HOSPITAL | Age: 71
End: 2021-01-01

## 2021-01-01 ENCOUNTER — HOSPITAL ENCOUNTER (OUTPATIENT)
Dept: ULTRASOUND IMAGING | Facility: HOSPITAL | Age: 71
Discharge: HOME | End: 2021-09-02
Admitting: INTERNAL MEDICINE

## 2021-01-01 ENCOUNTER — OFFICE VISIT (OUTPATIENT)
Dept: ONCOLOGY | Facility: CLINIC | Age: 71
End: 2021-01-01

## 2021-01-01 ENCOUNTER — HOSPITAL ENCOUNTER (OUTPATIENT)
Dept: PET IMAGING | Facility: HOSPITAL | Age: 71
End: 2021-01-01

## 2021-01-01 ENCOUNTER — APPOINTMENT (OUTPATIENT)
Dept: GENERAL RADIOLOGY | Facility: HOSPITAL | Age: 71
End: 2021-01-01

## 2021-01-01 ENCOUNTER — HOSPITAL ENCOUNTER (INPATIENT)
Facility: HOSPITAL | Age: 71
LOS: 2 days | End: 2021-11-08
Attending: EMERGENCY MEDICINE | Admitting: INTERNAL MEDICINE

## 2021-01-01 ENCOUNTER — APPOINTMENT (OUTPATIENT)
Dept: ULTRASOUND IMAGING | Facility: HOSPITAL | Age: 71
End: 2021-01-01

## 2021-01-01 ENCOUNTER — LAB (OUTPATIENT)
Dept: ONCOLOGY | Facility: CLINIC | Age: 71
End: 2021-01-01

## 2021-01-01 ENCOUNTER — APPOINTMENT (OUTPATIENT)
Dept: CARDIOLOGY | Facility: HOSPITAL | Age: 71
End: 2021-01-01

## 2021-01-01 ENCOUNTER — HOSPITAL ENCOUNTER (EMERGENCY)
Facility: HOSPITAL | Age: 71
Discharge: HOME | End: 2021-07-22
Attending: FAMILY MEDICINE | Admitting: FAMILY MEDICINE

## 2021-01-01 ENCOUNTER — CONSULT (OUTPATIENT)
Dept: ONCOLOGY | Facility: CLINIC | Age: 71
End: 2021-01-01

## 2021-01-01 ENCOUNTER — HOSPITAL ENCOUNTER (OUTPATIENT)
Dept: RESPIRATORY THERAPY | Facility: HOSPITAL | Age: 71
Discharge: HOME | End: 2021-05-18

## 2021-01-01 ENCOUNTER — DOCUMENTATION (OUTPATIENT)
Dept: ONCOLOGY | Facility: CLINIC | Age: 71
End: 2021-01-01

## 2021-01-01 ENCOUNTER — APPOINTMENT (OUTPATIENT)
Dept: RADIATION ONCOLOGY | Facility: HOSPITAL | Age: 71
End: 2021-01-01

## 2021-01-01 VITALS
DIASTOLIC BLOOD PRESSURE: 56 MMHG | OXYGEN SATURATION: 96 % | SYSTOLIC BLOOD PRESSURE: 100 MMHG | TEMPERATURE: 97.8 F | HEART RATE: 54 BPM | HEIGHT: 60 IN | WEIGHT: 130.2 LBS | RESPIRATION RATE: 22 BRPM | BODY MASS INDEX: 25.56 KG/M2

## 2021-01-01 VITALS
TEMPERATURE: 98.4 F | BODY MASS INDEX: 25.09 KG/M2 | DIASTOLIC BLOOD PRESSURE: 85 MMHG | OXYGEN SATURATION: 94 % | SYSTOLIC BLOOD PRESSURE: 159 MMHG | WEIGHT: 132.8 LBS | RESPIRATION RATE: 18 BRPM | HEART RATE: 79 BPM

## 2021-01-01 VITALS
HEART RATE: 94 BPM | SYSTOLIC BLOOD PRESSURE: 181 MMHG | RESPIRATION RATE: 18 BRPM | OXYGEN SATURATION: 92 % | TEMPERATURE: 97.5 F | DIASTOLIC BLOOD PRESSURE: 87 MMHG

## 2021-01-01 VITALS
TEMPERATURE: 97.5 F | BODY MASS INDEX: 27.19 KG/M2 | HEIGHT: 61 IN | DIASTOLIC BLOOD PRESSURE: 97 MMHG | WEIGHT: 144 LBS | SYSTOLIC BLOOD PRESSURE: 177 MMHG | OXYGEN SATURATION: 98 % | HEART RATE: 102 BPM | RESPIRATION RATE: 18 BRPM

## 2021-01-01 VITALS
RESPIRATION RATE: 18 BRPM | HEART RATE: 85 BPM | TEMPERATURE: 97.2 F | HEIGHT: 62 IN | WEIGHT: 145.6 LBS | DIASTOLIC BLOOD PRESSURE: 84 MMHG | SYSTOLIC BLOOD PRESSURE: 183 MMHG | OXYGEN SATURATION: 94 % | BODY MASS INDEX: 26.79 KG/M2

## 2021-01-01 VITALS
WEIGHT: 132.4 LBS | OXYGEN SATURATION: 100 % | RESPIRATION RATE: 18 BRPM | HEART RATE: 76 BPM | SYSTOLIC BLOOD PRESSURE: 176 MMHG | DIASTOLIC BLOOD PRESSURE: 86 MMHG | HEIGHT: 61 IN | BODY MASS INDEX: 25 KG/M2 | TEMPERATURE: 97.3 F

## 2021-01-01 DIAGNOSIS — C34.90 MALIGNANT NEOPLASM OF LUNG, UNSPECIFIED LATERALITY, UNSPECIFIED PART OF LUNG (HCC): Primary | ICD-10-CM

## 2021-01-01 DIAGNOSIS — C34.90 MALIGNANT NEOPLASM OF LUNG, UNSPECIFIED LATERALITY, UNSPECIFIED PART OF LUNG (HCC): ICD-10-CM

## 2021-01-01 DIAGNOSIS — E87.5 HYPERPOTASSEMIA: ICD-10-CM

## 2021-01-01 DIAGNOSIS — I48.92 ATRIAL FIBRILLATION AND FLUTTER (HCC): ICD-10-CM

## 2021-01-01 DIAGNOSIS — J96.22 ACUTE ON CHRONIC RESPIRATORY FAILURE WITH HYPOXIA AND HYPERCAPNIA (HCC): ICD-10-CM

## 2021-01-01 DIAGNOSIS — I48.91 ATRIAL FIBRILLATION AND FLUTTER (HCC): ICD-10-CM

## 2021-01-01 DIAGNOSIS — L30.9 DERMATITIS: ICD-10-CM

## 2021-01-01 DIAGNOSIS — N17.9 ACUTE RENAL FAILURE, UNSPECIFIED ACUTE RENAL FAILURE TYPE (HCC): Primary | ICD-10-CM

## 2021-01-01 DIAGNOSIS — R21 RASH: Primary | ICD-10-CM

## 2021-01-01 DIAGNOSIS — E87.5 HYPERPOTASSEMIA: Primary | ICD-10-CM

## 2021-01-01 DIAGNOSIS — Z79.899 LONG-TERM USE OF HIGH-RISK MEDICATION: ICD-10-CM

## 2021-01-01 DIAGNOSIS — N17.9 ACUTE RENAL FAILURE, UNSPECIFIED ACUTE RENAL FAILURE TYPE (HCC): ICD-10-CM

## 2021-01-01 DIAGNOSIS — J96.21 ACUTE ON CHRONIC RESPIRATORY FAILURE WITH HYPOXIA AND HYPERCAPNIA (HCC): ICD-10-CM

## 2021-01-01 DIAGNOSIS — C34.90 RECURRENT NON-SMALL CELL LUNG CANCER (HCC): Primary | ICD-10-CM

## 2021-01-01 LAB
A-A DO2: 108.8 MMHG (ref 0–300)
A-A DO2: 117.1 MMHG (ref 0–300)
ALBUMIN SERPL-MCNC: 3.28 G/DL (ref 3.5–5.2)
ALBUMIN SERPL-MCNC: 3.29 G/DL (ref 3.5–5.2)
ALBUMIN SERPL-MCNC: 3.39 G/DL (ref 3.5–5.2)
ALBUMIN SERPL-MCNC: 3.42 G/DL (ref 3.5–5.2)
ALBUMIN SERPL-MCNC: 3.51 G/DL (ref 3.5–5.2)
ALBUMIN SERPL-MCNC: 3.65 G/DL (ref 3.5–5.2)
ALBUMIN SERPL-MCNC: 3.8 G/DL (ref 3.5–5.2)
ALBUMIN/GLOB SERPL: 0.8 G/DL
ALBUMIN/GLOB SERPL: 0.9 G/DL
ALBUMIN/GLOB SERPL: 1 G/DL
ALP SERPL-CCNC: 112 U/L (ref 39–117)
ALP SERPL-CCNC: 116 U/L (ref 39–117)
ALP SERPL-CCNC: 133 U/L (ref 39–117)
ALP SERPL-CCNC: 151 U/L (ref 39–117)
ALP SERPL-CCNC: 156 U/L (ref 39–117)
ALP SERPL-CCNC: 161 U/L (ref 39–117)
ALP SERPL-CCNC: 186 U/L (ref 39–117)
ALT SERPL W P-5'-P-CCNC: 10 U/L (ref 1–33)
ALT SERPL W P-5'-P-CCNC: 11 U/L (ref 1–33)
ALT SERPL W P-5'-P-CCNC: 16 U/L (ref 1–33)
ALT SERPL W P-5'-P-CCNC: 19 U/L (ref 1–33)
ALT SERPL W P-5'-P-CCNC: 426 U/L (ref 1–33)
ALT SERPL W P-5'-P-CCNC: 544 U/L (ref 1–33)
ALT SERPL W P-5'-P-CCNC: 686 U/L (ref 1–33)
AMMONIA BLD-SCNC: 21 UMOL/L (ref 11–51)
ANION GAP SERPL CALCULATED.3IONS-SCNC: 11 MMOL/L (ref 5–15)
ANION GAP SERPL CALCULATED.3IONS-SCNC: 12.3 MMOL/L (ref 5–15)
ANION GAP SERPL CALCULATED.3IONS-SCNC: 6.8 MMOL/L (ref 5–15)
ANION GAP SERPL CALCULATED.3IONS-SCNC: 7.6 MMOL/L (ref 5–15)
ANION GAP SERPL CALCULATED.3IONS-SCNC: 8.6 MMOL/L (ref 5–15)
ANION GAP SERPL CALCULATED.3IONS-SCNC: 9 MMOL/L (ref 5–15)
ANION GAP SERPL CALCULATED.3IONS-SCNC: 9.3 MMOL/L (ref 5–15)
ANION GAP SERPL CALCULATED.3IONS-SCNC: 9.9 MMOL/L (ref 5–15)
ANISOCYTOSIS BLD QL: NORMAL
APTT PPP: 29.9 SECONDS (ref 25.5–35.4)
APTT PPP: 41.6 SECONDS (ref 25.5–35.4)
ARTERIAL PATENCY WRIST A: ABNORMAL
ARTERIAL PATENCY WRIST A: POSITIVE
AST SERPL-CCNC: 12 U/L (ref 1–32)
AST SERPL-CCNC: 14 U/L (ref 1–32)
AST SERPL-CCNC: 153 U/L (ref 1–32)
AST SERPL-CCNC: 22 U/L (ref 1–32)
AST SERPL-CCNC: 25 U/L (ref 1–32)
AST SERPL-CCNC: 260 U/L (ref 1–32)
AST SERPL-CCNC: 88 U/L (ref 1–32)
ATMOSPHERIC PRESS: 733 MMHG
ATMOSPHERIC PRESS: 734 MMHG
BACTERIA UR QL AUTO: ABNORMAL /HPF
BASE EXCESS BLDA CALC-SCNC: 3.8 MMOL/L (ref 0–2)
BASE EXCESS BLDA CALC-SCNC: 6.9 MMOL/L (ref 0–2)
BASOPHILS # BLD AUTO: 0.02 10*3/MM3 (ref 0–0.2)
BASOPHILS # BLD AUTO: 0.02 10*3/MM3 (ref 0–0.2)
BASOPHILS # BLD AUTO: 0.04 10*3/MM3 (ref 0–0.2)
BASOPHILS # BLD AUTO: 0.05 10*3/MM3 (ref 0–0.2)
BASOPHILS # BLD AUTO: 0.05 10*3/MM3 (ref 0–0.2)
BASOPHILS # BLD AUTO: 0.07 10*3/MM3 (ref 0–0.2)
BASOPHILS # BLD AUTO: 0.09 10*3/MM3 (ref 0–0.2)
BASOPHILS NFR BLD AUTO: 0.1 % (ref 0–1.5)
BASOPHILS NFR BLD AUTO: 0.1 % (ref 0–1.5)
BASOPHILS NFR BLD AUTO: 0.2 % (ref 0–1.5)
BASOPHILS NFR BLD AUTO: 0.5 % (ref 0–1.5)
BASOPHILS NFR BLD AUTO: 0.5 % (ref 0–1.5)
BASOPHILS NFR BLD AUTO: 0.7 % (ref 0–1.5)
BASOPHILS NFR BLD AUTO: 0.8 % (ref 0–1.5)
BDY SITE: ABNORMAL
BDY SITE: ABNORMAL
BH CV ECHO MEAS - ACS: 1.2 CM
BH CV ECHO MEAS - AO MAX PG: 12.3 MMHG
BH CV ECHO MEAS - AO MEAN PG: 6 MMHG
BH CV ECHO MEAS - AO ROOT AREA (BSA CORRECTED): 1.7
BH CV ECHO MEAS - AO ROOT AREA: 4.3 CM^2
BH CV ECHO MEAS - AO ROOT DIAM: 2.4 CM
BH CV ECHO MEAS - AO V2 MAX: 175 CM/SEC
BH CV ECHO MEAS - AO V2 MEAN: 106 CM/SEC
BH CV ECHO MEAS - AO V2 VTI: 29 CM
BH CV ECHO MEAS - BSA(HAYCOCK): 1.4 M^2
BH CV ECHO MEAS - BSA: 1.4 M^2
BH CV ECHO MEAS - BZI_BMI: 20.1 KILOGRAMS/M^2
BH CV ECHO MEAS - BZI_METRIC_HEIGHT: 152.4 CM
BH CV ECHO MEAS - BZI_METRIC_WEIGHT: 46.7 KG
BH CV ECHO MEAS - EDV(CUBED): 32.8 ML
BH CV ECHO MEAS - EDV(MOD-SP4): 29.3 ML
BH CV ECHO MEAS - EDV(TEICH): 41 ML
BH CV ECHO MEAS - EF(CUBED): 85 %
BH CV ECHO MEAS - EF(MOD-SP4): 82.6 %
BH CV ECHO MEAS - EF(TEICH): 79.5 %
BH CV ECHO MEAS - ESV(CUBED): 4.9 ML
BH CV ECHO MEAS - ESV(MOD-SP4): 5.1 ML
BH CV ECHO MEAS - ESV(TEICH): 8.4 ML
BH CV ECHO MEAS - FS: 46.9 %
BH CV ECHO MEAS - IVS/LVPW: 0.47
BH CV ECHO MEAS - IVSD: 0.8 CM
BH CV ECHO MEAS - LA DIMENSION: 3.5 CM
BH CV ECHO MEAS - LA/AO: 1.5
BH CV ECHO MEAS - LV DIASTOLIC VOL/BSA (35-75): 20.8 ML/M^2
BH CV ECHO MEAS - LV MASS(C)D: 127.4 GRAMS
BH CV ECHO MEAS - LV MASS(C)DI: 90.5 GRAMS/M^2
BH CV ECHO MEAS - LV SYSTOLIC VOL/BSA (12-30): 3.6 ML/M^2
BH CV ECHO MEAS - LVIDD: 3.2 CM
BH CV ECHO MEAS - LVIDS: 1.7 CM
BH CV ECHO MEAS - LVLD AP4: 5.4 CM
BH CV ECHO MEAS - LVLS AP4: 3.9 CM
BH CV ECHO MEAS - LVOT AREA (M): 2.3 CM^2
BH CV ECHO MEAS - LVOT AREA: 2.3 CM^2
BH CV ECHO MEAS - LVOT DIAM: 1.7 CM
BH CV ECHO MEAS - LVPWD: 1.7 CM
BH CV ECHO MEAS - MV E MAX VEL: 109 CM/SEC
BH CV ECHO MEAS - PA ACC TIME: 0.09 SEC
BH CV ECHO MEAS - PA PR(ACCEL): 39.4 MMHG
BH CV ECHO MEAS - RAP SYSTOLE: 10 MMHG
BH CV ECHO MEAS - RVSP: 53.3 MMHG
BH CV ECHO MEAS - SI(AO): 89.3 ML/M^2
BH CV ECHO MEAS - SI(CUBED): 19.8 ML/M^2
BH CV ECHO MEAS - SI(MOD-SP4): 17.2 ML/M^2
BH CV ECHO MEAS - SI(TEICH): 23.1 ML/M^2
BH CV ECHO MEAS - SV(AO): 125.8 ML
BH CV ECHO MEAS - SV(CUBED): 27.9 ML
BH CV ECHO MEAS - SV(MOD-SP4): 24.2 ML
BH CV ECHO MEAS - SV(TEICH): 32.6 ML
BH CV ECHO MEAS - TR MAX VEL: 329 CM/SEC
BILIRUB SERPL-MCNC: 0.4 MG/DL (ref 0–1.2)
BILIRUB SERPL-MCNC: 0.5 MG/DL (ref 0–1.2)
BILIRUB SERPL-MCNC: 0.8 MG/DL (ref 0–1.2)
BILIRUB SERPL-MCNC: 1.2 MG/DL (ref 0–1.2)
BILIRUB SERPL-MCNC: 1.5 MG/DL (ref 0–1.2)
BILIRUB UR QL STRIP: NEGATIVE
BODY TEMPERATURE: 0 C
BODY TEMPERATURE: 0 C
BUN SERPL-MCNC: 11 MG/DL (ref 8–23)
BUN SERPL-MCNC: 14 MG/DL (ref 8–23)
BUN SERPL-MCNC: 37 MG/DL (ref 8–23)
BUN SERPL-MCNC: 38 MG/DL (ref 8–23)
BUN SERPL-MCNC: 40 MG/DL (ref 8–23)
BUN SERPL-MCNC: 46 MG/DL (ref 8–23)
BUN/CREAT SERPL: 10.9 (ref 7–25)
BUN/CREAT SERPL: 12.1 (ref 7–25)
BUN/CREAT SERPL: 12.1 (ref 7–25)
BUN/CREAT SERPL: 12.6 (ref 7–25)
BUN/CREAT SERPL: 30.1 (ref 7–25)
BUN/CREAT SERPL: 33.9 (ref 7–25)
BUN/CREAT SERPL: 41.4 (ref 7–25)
BUN/CREAT SERPL: 44 (ref 7–25)
CALCIUM SPEC-SCNC: 8.1 MG/DL (ref 8.6–10.5)
CALCIUM SPEC-SCNC: 8.2 MG/DL (ref 8.6–10.5)
CALCIUM SPEC-SCNC: 8.4 MG/DL (ref 8.6–10.5)
CALCIUM SPEC-SCNC: 8.5 MG/DL (ref 8.6–10.5)
CALCIUM SPEC-SCNC: 9.2 MG/DL (ref 8.6–10.5)
CALCIUM SPEC-SCNC: 9.3 MG/DL (ref 8.6–10.5)
CALCIUM SPEC-SCNC: 9.4 MG/DL (ref 8.6–10.5)
CALCIUM SPEC-SCNC: 9.5 MG/DL (ref 8.6–10.5)
CHLORIDE SERPL-SCNC: 100 MMOL/L (ref 98–107)
CHLORIDE SERPL-SCNC: 101 MMOL/L (ref 98–107)
CHLORIDE SERPL-SCNC: 102 MMOL/L (ref 98–107)
CHLORIDE SERPL-SCNC: 103 MMOL/L (ref 98–107)
CHLORIDE SERPL-SCNC: 103 MMOL/L (ref 98–107)
CHLORIDE SERPL-SCNC: 97 MMOL/L (ref 98–107)
CHLORIDE SERPL-SCNC: 98 MMOL/L (ref 98–107)
CHLORIDE SERPL-SCNC: 99 MMOL/L (ref 98–107)
CK SERPL-CCNC: 55 U/L (ref 20–180)
CLARITY UR: CLEAR
CO2 BLDA-SCNC: 36.3 MMOL/L (ref 22–33)
CO2 BLDA-SCNC: 36.6 MMOL/L (ref 22–33)
CO2 SERPL-SCNC: 25.1 MMOL/L (ref 22–29)
CO2 SERPL-SCNC: 25.7 MMOL/L (ref 22–29)
CO2 SERPL-SCNC: 28 MMOL/L (ref 22–29)
CO2 SERPL-SCNC: 28 MMOL/L (ref 22–29)
CO2 SERPL-SCNC: 29.4 MMOL/L (ref 22–29)
CO2 SERPL-SCNC: 31.2 MMOL/L (ref 22–29)
CO2 SERPL-SCNC: 31.7 MMOL/L (ref 22–29)
CO2 SERPL-SCNC: 32.4 MMOL/L (ref 22–29)
COHGB MFR BLD: 2.2 % (ref 0–5)
COHGB MFR BLD: 2.4 % (ref 0–5)
COLOR UR: ABNORMAL
CREAT SERPL-MCNC: 0.84 MG/DL (ref 0.57–1)
CREAT SERPL-MCNC: 0.91 MG/DL (ref 0.57–1)
CREAT SERPL-MCNC: 0.91 MG/DL (ref 0.57–1)
CREAT SERPL-MCNC: 1.01 MG/DL (ref 0.57–1)
CREAT SERPL-MCNC: 1.11 MG/DL (ref 0.57–1)
CREAT SERPL-MCNC: 1.11 MG/DL (ref 0.57–1)
CREAT SERPL-MCNC: 1.12 MG/DL (ref 0.57–1)
CREAT SERPL-MCNC: 1.33 MG/DL (ref 0.57–1)
CRP SERPL-MCNC: 10.34 MG/DL (ref 0–0.5)
D-LACTATE SERPL-SCNC: 1.7 MMOL/L (ref 0.5–2)
D-LACTATE SERPL-SCNC: 2 MMOL/L (ref 0.5–2)
DEPRECATED RDW RBC AUTO: 48.3 FL (ref 37–54)
DEPRECATED RDW RBC AUTO: 52.5 FL (ref 37–54)
DEPRECATED RDW RBC AUTO: 54.4 FL (ref 37–54)
DEPRECATED RDW RBC AUTO: 57.1 FL (ref 37–54)
DEPRECATED RDW RBC AUTO: 58 FL (ref 37–54)
DEPRECATED RDW RBC AUTO: 59.2 FL (ref 37–54)
DEPRECATED RDW RBC AUTO: 61.1 FL (ref 37–54)
EOSINOPHIL # BLD AUTO: 0 10*3/MM3 (ref 0–0.4)
EOSINOPHIL # BLD AUTO: 0.01 10*3/MM3 (ref 0–0.4)
EOSINOPHIL # BLD AUTO: 0.01 10*3/MM3 (ref 0–0.4)
EOSINOPHIL # BLD AUTO: 0.14 10*3/MM3 (ref 0–0.4)
EOSINOPHIL # BLD AUTO: 0.18 10*3/MM3 (ref 0–0.4)
EOSINOPHIL # BLD AUTO: 0.19 10*3/MM3 (ref 0–0.4)
EOSINOPHIL # BLD AUTO: 0.22 10*3/MM3 (ref 0–0.4)
EOSINOPHIL NFR BLD AUTO: 0 % (ref 0.3–6.2)
EOSINOPHIL NFR BLD AUTO: 1.3 % (ref 0.3–6.2)
EOSINOPHIL NFR BLD AUTO: 1.8 % (ref 0.3–6.2)
EOSINOPHIL NFR BLD AUTO: 1.9 % (ref 0.3–6.2)
EOSINOPHIL NFR BLD AUTO: 1.9 % (ref 0.3–6.2)
ERYTHROCYTE [DISTWIDTH] IN BLOOD BY AUTOMATED COUNT: 14.9 % (ref 12.3–15.4)
ERYTHROCYTE [DISTWIDTH] IN BLOOD BY AUTOMATED COUNT: 15.9 % (ref 12.3–15.4)
ERYTHROCYTE [DISTWIDTH] IN BLOOD BY AUTOMATED COUNT: 16.3 % (ref 12.3–15.4)
ERYTHROCYTE [DISTWIDTH] IN BLOOD BY AUTOMATED COUNT: 16.4 % (ref 12.3–15.4)
ERYTHROCYTE [DISTWIDTH] IN BLOOD BY AUTOMATED COUNT: 16.5 % (ref 12.3–15.4)
ERYTHROCYTE [DISTWIDTH] IN BLOOD BY AUTOMATED COUNT: 16.9 % (ref 12.3–15.4)
ERYTHROCYTE [DISTWIDTH] IN BLOOD BY AUTOMATED COUNT: 17.6 % (ref 12.3–15.4)
FLUAV RNA RESP QL NAA+PROBE: NOT DETECTED
FLUBV RNA RESP QL NAA+PROBE: NOT DETECTED
GAS FLOW AIRWAY: 4 LPM
GAS FLOW AIRWAY: 4 LPM
GFR SERPL CREATININE-BSD FRML MDRD: 40 ML/MIN/1.73
GFR SERPL CREATININE-BSD FRML MDRD: 48 ML/MIN/1.73
GFR SERPL CREATININE-BSD FRML MDRD: 49 ML/MIN/1.73
GFR SERPL CREATININE-BSD FRML MDRD: 49 ML/MIN/1.73
GFR SERPL CREATININE-BSD FRML MDRD: 54 ML/MIN/1.73
GFR SERPL CREATININE-BSD FRML MDRD: 61 ML/MIN/1.73
GFR SERPL CREATININE-BSD FRML MDRD: 61 ML/MIN/1.73
GFR SERPL CREATININE-BSD FRML MDRD: 67 ML/MIN/1.73
GLOBULIN UR ELPH-MCNC: 3.4 GM/DL
GLOBULIN UR ELPH-MCNC: 3.4 GM/DL
GLOBULIN UR ELPH-MCNC: 3.8 GM/DL
GLOBULIN UR ELPH-MCNC: 3.8 GM/DL
GLOBULIN UR ELPH-MCNC: 3.9 GM/DL
GLOBULIN UR ELPH-MCNC: 4 GM/DL
GLOBULIN UR ELPH-MCNC: 4.1 GM/DL
GLUCOSE SERPL-MCNC: 105 MG/DL (ref 65–99)
GLUCOSE SERPL-MCNC: 110 MG/DL (ref 65–99)
GLUCOSE SERPL-MCNC: 119 MG/DL (ref 65–99)
GLUCOSE SERPL-MCNC: 125 MG/DL (ref 65–99)
GLUCOSE SERPL-MCNC: 138 MG/DL (ref 65–99)
GLUCOSE SERPL-MCNC: 94 MG/DL (ref 65–99)
GLUCOSE SERPL-MCNC: 94 MG/DL (ref 65–99)
GLUCOSE SERPL-MCNC: 97 MG/DL (ref 65–99)
GLUCOSE UR STRIP-MCNC: NEGATIVE MG/DL
HAV IGM SERPL QL IA: NORMAL
HBV CORE IGM SERPL QL IA: NORMAL
HBV SURFACE AG SERPL QL IA: NORMAL
HCO3 BLDA-SCNC: 34.1 MMOL/L (ref 20–26)
HCO3 BLDA-SCNC: 34.5 MMOL/L (ref 20–26)
HCT VFR BLD AUTO: 47.7 % (ref 34–46.6)
HCT VFR BLD AUTO: 47.8 % (ref 34–46.6)
HCT VFR BLD AUTO: 48.1 % (ref 34–46.6)
HCT VFR BLD AUTO: 48.8 % (ref 34–46.6)
HCT VFR BLD AUTO: 50.2 % (ref 34–46.6)
HCT VFR BLD AUTO: 51.1 % (ref 34–46.6)
HCT VFR BLD AUTO: 51.2 % (ref 34–46.6)
HCT VFR BLD CALC: 43.1 % (ref 38–51)
HCT VFR BLD CALC: 44 % (ref 38–51)
HCV AB SER DONR QL: NORMAL
HGB BLD-MCNC: 13.1 G/DL (ref 12–15.9)
HGB BLD-MCNC: 13.7 G/DL (ref 12–15.9)
HGB BLD-MCNC: 14.4 G/DL (ref 12–15.9)
HGB BLD-MCNC: 15.1 G/DL (ref 12–15.9)
HGB BLD-MCNC: 15.2 G/DL (ref 12–15.9)
HGB BLD-MCNC: 15.5 G/DL (ref 12–15.9)
HGB BLD-MCNC: 15.6 G/DL (ref 12–15.9)
HGB BLDA-MCNC: 14.1 G/DL (ref 13.5–17.5)
HGB BLDA-MCNC: 14.3 G/DL (ref 13.5–17.5)
HGB UR QL STRIP.AUTO: NEGATIVE
HOLD SPECIMEN: NORMAL
HYALINE CASTS UR QL AUTO: ABNORMAL /LPF
HYPOCHROMIA BLD QL: NORMAL
HYPOCHROMIA BLD QL: NORMAL
IMM GRANULOCYTES # BLD AUTO: 0.03 10*3/MM3 (ref 0–0.05)
IMM GRANULOCYTES # BLD AUTO: 0.03 10*3/MM3 (ref 0–0.05)
IMM GRANULOCYTES # BLD AUTO: 0.04 10*3/MM3 (ref 0–0.05)
IMM GRANULOCYTES # BLD AUTO: 0.05 10*3/MM3 (ref 0–0.05)
IMM GRANULOCYTES # BLD AUTO: 0.1 10*3/MM3 (ref 0–0.05)
IMM GRANULOCYTES # BLD AUTO: 0.14 10*3/MM3 (ref 0–0.05)
IMM GRANULOCYTES # BLD AUTO: 0.15 10*3/MM3 (ref 0–0.05)
IMM GRANULOCYTES NFR BLD AUTO: 0.3 % (ref 0–0.5)
IMM GRANULOCYTES NFR BLD AUTO: 0.3 % (ref 0–0.5)
IMM GRANULOCYTES NFR BLD AUTO: 0.4 % (ref 0–0.5)
IMM GRANULOCYTES NFR BLD AUTO: 0.4 % (ref 0–0.5)
IMM GRANULOCYTES NFR BLD AUTO: 0.5 % (ref 0–0.5)
IMM GRANULOCYTES NFR BLD AUTO: 0.7 % (ref 0–0.5)
IMM GRANULOCYTES NFR BLD AUTO: 0.7 % (ref 0–0.5)
INHALED O2 CONCENTRATION: 36 %
INHALED O2 CONCENTRATION: 36 %
INR PPP: 1.49 (ref 0.9–1.1)
KETONES UR QL STRIP: NEGATIVE
LEUKOCYTE ESTERASE UR QL STRIP.AUTO: ABNORMAL
LV EF 2D ECHO EST: 65 %
LYMPHOCYTES # BLD AUTO: 0.4 10*3/MM3 (ref 0.7–3.1)
LYMPHOCYTES # BLD AUTO: 0.56 10*3/MM3 (ref 0.7–3.1)
LYMPHOCYTES # BLD AUTO: 0.65 10*3/MM3 (ref 0.7–3.1)
LYMPHOCYTES # BLD AUTO: 0.93 10*3/MM3 (ref 0.7–3.1)
LYMPHOCYTES # BLD AUTO: 1.16 10*3/MM3 (ref 0.7–3.1)
LYMPHOCYTES # BLD AUTO: 1.26 10*3/MM3 (ref 0.7–3.1)
LYMPHOCYTES # BLD AUTO: 1.67 10*3/MM3 (ref 0.7–3.1)
LYMPHOCYTES NFR BLD AUTO: 1.9 % (ref 19.6–45.3)
LYMPHOCYTES NFR BLD AUTO: 11.7 % (ref 19.6–45.3)
LYMPHOCYTES NFR BLD AUTO: 12.4 % (ref 19.6–45.3)
LYMPHOCYTES NFR BLD AUTO: 14.4 % (ref 19.6–45.3)
LYMPHOCYTES NFR BLD AUTO: 2.8 % (ref 19.6–45.3)
LYMPHOCYTES NFR BLD AUTO: 2.9 % (ref 19.6–45.3)
LYMPHOCYTES NFR BLD AUTO: 9 % (ref 19.6–45.3)
Lab: ABNORMAL
MAGNESIUM SERPL-MCNC: 2.3 MG/DL (ref 1.6–2.4)
MAXIMAL PREDICTED HEART RATE: 151 BPM
MCH RBC QN AUTO: 27.4 PG (ref 26.6–33)
MCH RBC QN AUTO: 27.4 PG (ref 26.6–33)
MCH RBC QN AUTO: 27.5 PG (ref 26.6–33)
MCH RBC QN AUTO: 27.6 PG (ref 26.6–33)
MCH RBC QN AUTO: 27.7 PG (ref 26.6–33)
MCH RBC QN AUTO: 28.1 PG (ref 26.6–33)
MCH RBC QN AUTO: 28.1 PG (ref 26.6–33)
MCHC RBC AUTO-ENTMCNC: 27.5 G/DL (ref 31.5–35.7)
MCHC RBC AUTO-ENTMCNC: 28.1 G/DL (ref 31.5–35.7)
MCHC RBC AUTO-ENTMCNC: 29.5 G/DL (ref 31.5–35.7)
MCHC RBC AUTO-ENTMCNC: 30.1 G/DL (ref 31.5–35.7)
MCHC RBC AUTO-ENTMCNC: 30.3 G/DL (ref 31.5–35.7)
MCHC RBC AUTO-ENTMCNC: 31.1 G/DL (ref 31.5–35.7)
MCHC RBC AUTO-ENTMCNC: 31.6 G/DL (ref 31.5–35.7)
MCV RBC AUTO: 100.2 FL (ref 79–97)
MCV RBC AUTO: 88.9 FL (ref 79–97)
MCV RBC AUTO: 90.5 FL (ref 79–97)
MCV RBC AUTO: 90.5 FL (ref 79–97)
MCV RBC AUTO: 91.6 FL (ref 79–97)
MCV RBC AUTO: 93.6 FL (ref 79–97)
MCV RBC AUTO: 97.6 FL (ref 79–97)
METHGB BLD QL: 0 % (ref 0–3)
METHGB BLD QL: <-0.1 % (ref 0–3)
MODALITY: ABNORMAL
MODALITY: ABNORMAL
MONOCYTES # BLD AUTO: 0.68 10*3/MM3 (ref 0.1–0.9)
MONOCYTES # BLD AUTO: 0.71 10*3/MM3 (ref 0.1–0.9)
MONOCYTES # BLD AUTO: 1.12 10*3/MM3 (ref 0.1–0.9)
MONOCYTES # BLD AUTO: 1.27 10*3/MM3 (ref 0.1–0.9)
MONOCYTES # BLD AUTO: 1.95 10*3/MM3 (ref 0.1–0.9)
MONOCYTES # BLD AUTO: 2.88 10*3/MM3 (ref 0.1–0.9)
MONOCYTES # BLD AUTO: 3.02 10*3/MM3 (ref 0.1–0.9)
MONOCYTES NFR BLD AUTO: 10.4 % (ref 5–12)
MONOCYTES NFR BLD AUTO: 10.9 % (ref 5–12)
MONOCYTES NFR BLD AUTO: 12.9 % (ref 5–12)
MONOCYTES NFR BLD AUTO: 14.4 % (ref 5–12)
MONOCYTES NFR BLD AUTO: 6.9 % (ref 5–12)
MONOCYTES NFR BLD AUTO: 7.3 % (ref 5–12)
MONOCYTES NFR BLD AUTO: 9.7 % (ref 5–12)
MRSA DNA SPEC QL NAA+PROBE: NEGATIVE
NEUTROPHILS NFR BLD AUTO: 17.38 10*3/MM3 (ref 1.7–7)
NEUTROPHILS NFR BLD AUTO: 17.48 10*3/MM3 (ref 1.7–7)
NEUTROPHILS NFR BLD AUTO: 18.52 10*3/MM3 (ref 1.7–7)
NEUTROPHILS NFR BLD AUTO: 7.25 10*3/MM3 (ref 1.7–7)
NEUTROPHILS NFR BLD AUTO: 71.6 % (ref 42.7–76)
NEUTROPHILS NFR BLD AUTO: 75.7 % (ref 42.7–76)
NEUTROPHILS NFR BLD AUTO: 77.6 % (ref 42.7–76)
NEUTROPHILS NFR BLD AUTO: 8.16 10*3/MM3 (ref 1.7–7)
NEUTROPHILS NFR BLD AUTO: 8.32 10*3/MM3 (ref 1.7–7)
NEUTROPHILS NFR BLD AUTO: 8.39 10*3/MM3 (ref 1.7–7)
NEUTROPHILS NFR BLD AUTO: 81.3 % (ref 42.7–76)
NEUTROPHILS NFR BLD AUTO: 82.9 % (ref 42.7–76)
NEUTROPHILS NFR BLD AUTO: 83.3 % (ref 42.7–76)
NEUTROPHILS NFR BLD AUTO: 86.9 % (ref 42.7–76)
NITRITE UR QL STRIP: NEGATIVE
NOTE: ABNORMAL
NOTE: ABNORMAL
NOTIFIED BY: ABNORMAL
NOTIFIED WHO: ABNORMAL
NRBC BLD AUTO-RTO: 0 /100 WBC (ref 0–0.2)
NRBC BLD AUTO-RTO: 0.3 /100 WBC (ref 0–0.2)
NRBC BLD AUTO-RTO: 0.4 /100 WBC (ref 0–0.2)
NRBC BLD AUTO-RTO: 1 /100 WBC (ref 0–0.2)
NT-PROBNP SERPL-MCNC: 4091 PG/ML (ref 0–900)
OXYHGB MFR BLDV: 74.8 % (ref 94–99)
OXYHGB MFR BLDV: 88.3 % (ref 94–99)
PCO2 BLDA: 60.7 MM HG (ref 35–45)
PCO2 BLDA: 80.8 MM HG (ref 35–45)
PCO2 TEMP ADJ BLD: ABNORMAL MM[HG]
PCO2 TEMP ADJ BLD: ABNORMAL MM[HG]
PH BLDA: 7.23 PH UNITS (ref 7.35–7.45)
PH BLDA: 7.36 PH UNITS (ref 7.35–7.45)
PH UR STRIP.AUTO: 6 [PH] (ref 5–8)
PH, TEMP CORRECTED: ABNORMAL
PH, TEMP CORRECTED: ABNORMAL
PHOSPHATE SERPL-MCNC: 2.8 MG/DL (ref 2.5–4.5)
PLAT MORPH BLD: NORMAL
PLAT MORPH BLD: NORMAL
PLATELET # BLD AUTO: 159 10*3/MM3 (ref 140–450)
PLATELET # BLD AUTO: 172 10*3/MM3 (ref 140–450)
PLATELET # BLD AUTO: 180 10*3/MM3 (ref 140–450)
PLATELET # BLD AUTO: 238 10*3/MM3 (ref 140–450)
PLATELET # BLD AUTO: 257 10*3/MM3 (ref 140–450)
PLATELET # BLD AUTO: 272 10*3/MM3 (ref 140–450)
PLATELET # BLD AUTO: 294 10*3/MM3 (ref 140–450)
PMV BLD AUTO: 10 FL (ref 6–12)
PMV BLD AUTO: 10.2 FL (ref 6–12)
PMV BLD AUTO: 10.3 FL (ref 6–12)
PMV BLD AUTO: 10.7 FL (ref 6–12)
PMV BLD AUTO: 10.7 FL (ref 6–12)
PMV BLD AUTO: 10.8 FL (ref 6–12)
PMV BLD AUTO: 11.2 FL (ref 6–12)
PO2 BLDA: 49.2 MM HG (ref 83–108)
PO2 BLDA: 62.9 MM HG (ref 83–108)
PO2 TEMP ADJ BLD: ABNORMAL MM[HG]
PO2 TEMP ADJ BLD: ABNORMAL MM[HG]
POTASSIUM SERPL-SCNC: 3.8 MMOL/L (ref 3.5–5.2)
POTASSIUM SERPL-SCNC: 4.1 MMOL/L (ref 3.5–5.2)
POTASSIUM SERPL-SCNC: 4.2 MMOL/L (ref 3.5–5.2)
POTASSIUM SERPL-SCNC: 4.9 MMOL/L (ref 3.5–5.2)
POTASSIUM SERPL-SCNC: 4.9 MMOL/L (ref 3.5–5.2)
POTASSIUM SERPL-SCNC: 5 MMOL/L (ref 3.5–5.2)
POTASSIUM SERPL-SCNC: 5.1 MMOL/L (ref 3.5–5.2)
POTASSIUM SERPL-SCNC: 6 MMOL/L (ref 3.5–5.2)
PROT SERPL-MCNC: 6.7 G/DL (ref 6–8.5)
PROT SERPL-MCNC: 6.8 G/DL (ref 6–8.5)
PROT SERPL-MCNC: 7.2 G/DL (ref 6–8.5)
PROT SERPL-MCNC: 7.2 G/DL (ref 6–8.5)
PROT SERPL-MCNC: 7.6 G/DL (ref 6–8.5)
PROT UR QL STRIP: ABNORMAL
PROTHROMBIN TIME: 18.4 SECONDS (ref 12.8–14.5)
QT INTERVAL: 260 MS
QT INTERVAL: 286 MS
QT INTERVAL: 354 MS
QT INTERVAL: 356 MS
QT INTERVAL: 364 MS
QTC INTERVAL: 329 MS
QTC INTERVAL: 360 MS
QTC INTERVAL: 419 MS
QTC INTERVAL: 436 MS
QTC INTERVAL: 445 MS
RBC # BLD AUTO: 4.76 10*6/MM3 (ref 3.77–5.28)
RBC # BLD AUTO: 5 10*6/MM3 (ref 3.77–5.28)
RBC # BLD AUTO: 5.22 10*6/MM3 (ref 3.77–5.28)
RBC # BLD AUTO: 5.41 10*6/MM3 (ref 3.77–5.28)
RBC # BLD AUTO: 5.46 10*6/MM3 (ref 3.77–5.28)
RBC # BLD AUTO: 5.55 10*6/MM3 (ref 3.77–5.28)
RBC # BLD AUTO: 5.66 10*6/MM3 (ref 3.77–5.28)
RBC # UR: ABNORMAL /HPF
REF LAB TEST METHOD: ABNORMAL
S AUREUS DNA SPEC QL NAA+PROBE: NEGATIVE
SAO2 % BLDCOA: 76.2 % (ref 94–99)
SAO2 % BLDCOA: 90.4 % (ref 94–99)
SARS-COV-2 RNA RESP QL NAA+PROBE: NOT DETECTED
SODIUM SERPL-SCNC: 136 MMOL/L (ref 136–145)
SODIUM SERPL-SCNC: 137 MMOL/L (ref 136–145)
SODIUM SERPL-SCNC: 137 MMOL/L (ref 136–145)
SODIUM SERPL-SCNC: 138 MMOL/L (ref 136–145)
SODIUM SERPL-SCNC: 138 MMOL/L (ref 136–145)
SODIUM SERPL-SCNC: 140 MMOL/L (ref 136–145)
SODIUM SERPL-SCNC: 141 MMOL/L (ref 136–145)
SODIUM SERPL-SCNC: 142 MMOL/L (ref 136–145)
SP GR UR STRIP: >1.03 (ref 1–1.03)
SQUAMOUS #/AREA URNS HPF: ABNORMAL /HPF
STRESS TARGET HR: 128 BPM
T4 FREE SERPL-MCNC: 0.71 NG/DL (ref 0.93–1.7)
T4 FREE SERPL-MCNC: 1.04 NG/DL (ref 0.93–1.7)
TROPONIN T SERPL-MCNC: 0.13 NG/ML (ref 0–0.03)
TROPONIN T SERPL-MCNC: 0.15 NG/ML (ref 0–0.03)
TSH SERPL DL<=0.05 MIU/L-ACNC: 2.48 UIU/ML (ref 0.27–4.2)
TSH SERPL DL<=0.05 MIU/L-ACNC: 7.42 UIU/ML (ref 0.27–4.2)
UROBILINOGEN UR QL STRIP: ABNORMAL
VANCOMYCIN TROUGH SERPL-MCNC: 14.4 MCG/ML (ref 5–20)
VENTILATOR MODE: ABNORMAL
VENTILATOR MODE: ABNORMAL
WBC # BLD AUTO: 10.32 10*3/MM3 (ref 3.4–10.8)
WBC # BLD AUTO: 10.77 10*3/MM3 (ref 3.4–10.8)
WBC # BLD AUTO: 11.62 10*3/MM3 (ref 3.4–10.8)
WBC # BLD AUTO: 20.12 10*3/MM3 (ref 3.4–10.8)
WBC # BLD AUTO: 20.96 10*3/MM3 (ref 3.4–10.8)
WBC # BLD AUTO: 22.25 10*3/MM3 (ref 3.4–10.8)
WBC # BLD AUTO: 9.35 10*3/MM3 (ref 3.4–10.8)
WBC UR QL AUTO: ABNORMAL /HPF
WHOLE BLOOD HOLD SPECIMEN: NORMAL

## 2021-01-01 PROCEDURE — 85025 COMPLETE CBC W/AUTO DIFF WBC: CPT | Performed by: EMERGENCY MEDICINE

## 2021-01-01 PROCEDURE — 99232 SBSQ HOSP IP/OBS MODERATE 35: CPT | Performed by: STUDENT IN AN ORGANIZED HEALTH CARE EDUCATION/TRAINING PROGRAM

## 2021-01-01 PROCEDURE — 25010000002 VANCOMYCIN PER 500 MG: Performed by: EMERGENCY MEDICINE

## 2021-01-01 PROCEDURE — 80053 COMPREHEN METABOLIC PANEL: CPT

## 2021-01-01 PROCEDURE — 80053 COMPREHEN METABOLIC PANEL: CPT | Performed by: EMERGENCY MEDICINE

## 2021-01-01 PROCEDURE — 87636 SARSCOV2 & INF A&B AMP PRB: CPT | Performed by: EMERGENCY MEDICINE

## 2021-01-01 PROCEDURE — 85730 THROMBOPLASTIN TIME PARTIAL: CPT | Performed by: STUDENT IN AN ORGANIZED HEALTH CARE EDUCATION/TRAINING PROGRAM

## 2021-01-01 PROCEDURE — 25010000002 CEFEPIME PER 500 MG: Performed by: EMERGENCY MEDICINE

## 2021-01-01 PROCEDURE — 36415 COLL VENOUS BLD VENIPUNCTURE: CPT

## 2021-01-01 PROCEDURE — 83605 ASSAY OF LACTIC ACID: CPT | Performed by: EMERGENCY MEDICINE

## 2021-01-01 PROCEDURE — 25010000002 DIGOXIN PER 500 MCG: Performed by: INTERNAL MEDICINE

## 2021-01-01 PROCEDURE — 85025 COMPLETE CBC W/AUTO DIFF WBC: CPT | Performed by: STUDENT IN AN ORGANIZED HEALTH CARE EDUCATION/TRAINING PROGRAM

## 2021-01-01 PROCEDURE — 99205 OFFICE O/P NEW HI 60 MIN: CPT | Performed by: INTERNAL MEDICINE

## 2021-01-01 PROCEDURE — 76775 US EXAM ABDO BACK WALL LIM: CPT

## 2021-01-01 PROCEDURE — 99239 HOSP IP/OBS DSCHRG MGMT >30: CPT | Performed by: STUDENT IN AN ORGANIZED HEALTH CARE EDUCATION/TRAINING PROGRAM

## 2021-01-01 PROCEDURE — 94799 UNLISTED PULMONARY SVC/PX: CPT

## 2021-01-01 PROCEDURE — 71275 CT ANGIOGRAPHY CHEST: CPT

## 2021-01-01 PROCEDURE — 70450 CT HEAD/BRAIN W/O DYE: CPT

## 2021-01-01 PROCEDURE — 81001 URINALYSIS AUTO W/SCOPE: CPT | Performed by: INTERNAL MEDICINE

## 2021-01-01 PROCEDURE — 80053 COMPREHEN METABOLIC PANEL: CPT | Performed by: INTERNAL MEDICINE

## 2021-01-01 PROCEDURE — 93306 TTE W/DOPPLER COMPLETE: CPT

## 2021-01-01 PROCEDURE — 71250 CT THORAX DX C-: CPT | Performed by: RADIOLOGY

## 2021-01-01 PROCEDURE — 85610 PROTHROMBIN TIME: CPT | Performed by: STUDENT IN AN ORGANIZED HEALTH CARE EDUCATION/TRAINING PROGRAM

## 2021-01-01 PROCEDURE — 99223 1ST HOSP IP/OBS HIGH 75: CPT | Performed by: INTERNAL MEDICINE

## 2021-01-01 PROCEDURE — 74176 CT ABD & PELVIS W/O CONTRAST: CPT

## 2021-01-01 PROCEDURE — 71045 X-RAY EXAM CHEST 1 VIEW: CPT

## 2021-01-01 PROCEDURE — 36600 WITHDRAWAL OF ARTERIAL BLOOD: CPT

## 2021-01-01 PROCEDURE — 93923 UPR/LXTR ART STDY 3+ LVLS: CPT

## 2021-01-01 PROCEDURE — 74176 CT ABD & PELVIS W/O CONTRAST: CPT | Performed by: RADIOLOGY

## 2021-01-01 PROCEDURE — 0 IOPAMIDOL PER 1 ML: Performed by: STUDENT IN AN ORGANIZED HEALTH CARE EDUCATION/TRAINING PROGRAM

## 2021-01-01 PROCEDURE — 93005 ELECTROCARDIOGRAM TRACING: CPT | Performed by: STUDENT IN AN ORGANIZED HEALTH CARE EDUCATION/TRAINING PROGRAM

## 2021-01-01 PROCEDURE — 25010000002 MORPHINE PER 10 MG: Performed by: INTERNAL MEDICINE

## 2021-01-01 PROCEDURE — 82140 ASSAY OF AMMONIA: CPT | Performed by: EMERGENCY MEDICINE

## 2021-01-01 PROCEDURE — 85007 BL SMEAR W/DIFF WBC COUNT: CPT | Performed by: INTERNAL MEDICINE

## 2021-01-01 PROCEDURE — 99213 OFFICE O/P EST LOW 20 MIN: CPT | Performed by: NURSE PRACTITIONER

## 2021-01-01 PROCEDURE — 25010000002 MORPHINE PER 10 MG: Performed by: STUDENT IN AN ORGANIZED HEALTH CARE EDUCATION/TRAINING PROGRAM

## 2021-01-01 PROCEDURE — 93005 ELECTROCARDIOGRAM TRACING: CPT | Performed by: EMERGENCY MEDICINE

## 2021-01-01 PROCEDURE — 63710000001 INSULIN REGULAR HUMAN PER 5 UNITS: Performed by: STUDENT IN AN ORGANIZED HEALTH CARE EDUCATION/TRAINING PROGRAM

## 2021-01-01 PROCEDURE — 0 FLUDEOXYGLUCOSE F18 SOLUTION: Performed by: INTERNAL MEDICINE

## 2021-01-01 PROCEDURE — 99285 EMERGENCY DEPT VISIT HI MDM: CPT

## 2021-01-01 PROCEDURE — 78815 PET IMAGE W/CT SKULL-THIGH: CPT

## 2021-01-01 PROCEDURE — 83050 HGB METHEMOGLOBIN QUAN: CPT

## 2021-01-01 PROCEDURE — 87040 BLOOD CULTURE FOR BACTERIA: CPT | Performed by: EMERGENCY MEDICINE

## 2021-01-01 PROCEDURE — 80202 ASSAY OF VANCOMYCIN: CPT | Performed by: STUDENT IN AN ORGANIZED HEALTH CARE EDUCATION/TRAINING PROGRAM

## 2021-01-01 PROCEDURE — 84443 ASSAY THYROID STIM HORMONE: CPT | Performed by: INTERNAL MEDICINE

## 2021-01-01 PROCEDURE — 0 IOPAMIDOL PER 1 ML: Performed by: EMERGENCY MEDICINE

## 2021-01-01 PROCEDURE — 80053 COMPREHEN METABOLIC PANEL: CPT | Performed by: STUDENT IN AN ORGANIZED HEALTH CARE EDUCATION/TRAINING PROGRAM

## 2021-01-01 PROCEDURE — 85025 COMPLETE CBC W/AUTO DIFF WBC: CPT | Performed by: INTERNAL MEDICINE

## 2021-01-01 PROCEDURE — 25010000002 CEFEPIME PER 500 MG: Performed by: INTERNAL MEDICINE

## 2021-01-01 PROCEDURE — 82805 BLOOD GASES W/O2 SATURATION: CPT

## 2021-01-01 PROCEDURE — 94660 CPAP INITIATION&MGMT: CPT

## 2021-01-01 PROCEDURE — 94640 AIRWAY INHALATION TREATMENT: CPT

## 2021-01-01 PROCEDURE — 78815 PET IMAGE W/CT SKULL-THIGH: CPT | Performed by: RADIOLOGY

## 2021-01-01 PROCEDURE — 99214 OFFICE O/P EST MOD 30 MIN: CPT | Performed by: INTERNAL MEDICINE

## 2021-01-01 PROCEDURE — 25010000002 MIDAZOLAM PER 1MG

## 2021-01-01 PROCEDURE — 93005 ELECTROCARDIOGRAM TRACING: CPT | Performed by: NURSE PRACTITIONER

## 2021-01-01 PROCEDURE — 25010000002 HEPARIN (PORCINE) PER 1000 UNITS: Performed by: STUDENT IN AN ORGANIZED HEALTH CARE EDUCATION/TRAINING PROGRAM

## 2021-01-01 PROCEDURE — 93970 EXTREMITY STUDY: CPT

## 2021-01-01 PROCEDURE — 80074 ACUTE HEPATITIS PANEL: CPT | Performed by: INTERNAL MEDICINE

## 2021-01-01 PROCEDURE — 74177 CT ABD & PELVIS W/CONTRAST: CPT

## 2021-01-01 PROCEDURE — 25010000002 VANCOMYCIN PER 500 MG: Performed by: INTERNAL MEDICINE

## 2021-01-01 PROCEDURE — 84100 ASSAY OF PHOSPHORUS: CPT | Performed by: EMERGENCY MEDICINE

## 2021-01-01 PROCEDURE — 25010000002 CEFEPIME PER 500 MG: Performed by: STUDENT IN AN ORGANIZED HEALTH CARE EDUCATION/TRAINING PROGRAM

## 2021-01-01 PROCEDURE — 85007 BL SMEAR W/DIFF WBC COUNT: CPT | Performed by: STUDENT IN AN ORGANIZED HEALTH CARE EDUCATION/TRAINING PROGRAM

## 2021-01-01 PROCEDURE — 25010000002 ONDANSETRON PER 1 MG

## 2021-01-01 PROCEDURE — 85025 COMPLETE CBC W/AUTO DIFF WBC: CPT

## 2021-01-01 PROCEDURE — 83735 ASSAY OF MAGNESIUM: CPT | Performed by: EMERGENCY MEDICINE

## 2021-01-01 PROCEDURE — 76775 US EXAM ABDO BACK WALL LIM: CPT | Performed by: RADIOLOGY

## 2021-01-01 PROCEDURE — 84443 ASSAY THYROID STIM HORMONE: CPT | Performed by: EMERGENCY MEDICINE

## 2021-01-01 PROCEDURE — 84439 ASSAY OF FREE THYROXINE: CPT | Performed by: EMERGENCY MEDICINE

## 2021-01-01 PROCEDURE — 83605 ASSAY OF LACTIC ACID: CPT | Performed by: STUDENT IN AN ORGANIZED HEALTH CARE EDUCATION/TRAINING PROGRAM

## 2021-01-01 PROCEDURE — 93010 ELECTROCARDIOGRAM REPORT: CPT | Performed by: INTERNAL MEDICINE

## 2021-01-01 PROCEDURE — 83880 ASSAY OF NATRIURETIC PEPTIDE: CPT | Performed by: EMERGENCY MEDICINE

## 2021-01-01 PROCEDURE — 25010000002 CALCIUM GLUCONATE-NACL 1-0.675 GM/50ML-% SOLUTION: Performed by: STUDENT IN AN ORGANIZED HEALTH CARE EDUCATION/TRAINING PROGRAM

## 2021-01-01 PROCEDURE — 82375 ASSAY CARBOXYHB QUANT: CPT

## 2021-01-01 PROCEDURE — 99222 1ST HOSP IP/OBS MODERATE 55: CPT | Performed by: INTERNAL MEDICINE

## 2021-01-01 PROCEDURE — A9552 F18 FDG: HCPCS | Performed by: INTERNAL MEDICINE

## 2021-01-01 PROCEDURE — 99283 EMERGENCY DEPT VISIT LOW MDM: CPT

## 2021-01-01 PROCEDURE — 75635 CT ANGIO ABDOMINAL ARTERIES: CPT

## 2021-01-01 PROCEDURE — 93306 TTE W/DOPPLER COMPLETE: CPT | Performed by: INTERNAL MEDICINE

## 2021-01-01 PROCEDURE — 87641 MR-STAPH DNA AMP PROBE: CPT | Performed by: INTERNAL MEDICINE

## 2021-01-01 PROCEDURE — 25010000002 DIGOXIN PER 500 MCG: Performed by: EMERGENCY MEDICINE

## 2021-01-01 PROCEDURE — 71250 CT THORAX DX C-: CPT

## 2021-01-01 PROCEDURE — 86140 C-REACTIVE PROTEIN: CPT | Performed by: EMERGENCY MEDICINE

## 2021-01-01 PROCEDURE — 82550 ASSAY OF CK (CPK): CPT | Performed by: EMERGENCY MEDICINE

## 2021-01-01 PROCEDURE — 84439 ASSAY OF FREE THYROXINE: CPT | Performed by: INTERNAL MEDICINE

## 2021-01-01 PROCEDURE — 84484 ASSAY OF TROPONIN QUANT: CPT | Performed by: EMERGENCY MEDICINE

## 2021-01-01 PROCEDURE — 25010000002 FUROSEMIDE PER 20 MG: Performed by: INTERNAL MEDICINE

## 2021-01-01 PROCEDURE — 87640 STAPH A DNA AMP PROBE: CPT | Performed by: INTERNAL MEDICINE

## 2021-01-01 PROCEDURE — 5A09357 ASSISTANCE WITH RESPIRATORY VENTILATION, LESS THAN 24 CONSECUTIVE HOURS, CONTINUOUS POSITIVE AIRWAY PRESSURE: ICD-10-PCS | Performed by: INTERNAL MEDICINE

## 2021-01-01 RX ORDER — SODIUM CHLORIDE 0.9 % (FLUSH) 0.9 %
10 SYRINGE (ML) INJECTION AS NEEDED
Status: DISCONTINUED | OUTPATIENT
Start: 2021-01-01 | End: 2021-01-01 | Stop reason: HOSPADM

## 2021-01-01 RX ORDER — MORPHINE SULFATE 2 MG/ML
1 INJECTION, SOLUTION INTRAMUSCULAR; INTRAVENOUS EVERY 4 HOURS PRN
Status: DISCONTINUED | OUTPATIENT
Start: 2021-01-01 | End: 2021-01-01

## 2021-01-01 RX ORDER — DIGOXIN 0.25 MG/ML
250 INJECTION INTRAMUSCULAR; INTRAVENOUS ONCE
Status: COMPLETED | OUTPATIENT
Start: 2021-01-01 | End: 2021-01-01

## 2021-01-01 RX ORDER — PERMETHRIN 50 MG/G
CREAM TOPICAL ONCE
Status: COMPLETED | OUTPATIENT
Start: 2021-01-01 | End: 2021-01-01

## 2021-01-01 RX ORDER — GABAPENTIN 400 MG/1
400 CAPSULE ORAL EVERY 12 HOURS SCHEDULED
Status: CANCELLED | OUTPATIENT
Start: 2021-01-01

## 2021-01-01 RX ORDER — MIDAZOLAM HYDROCHLORIDE 2 MG/2ML
INJECTION, SOLUTION INTRAMUSCULAR; INTRAVENOUS
Status: COMPLETED
Start: 2021-01-01 | End: 2021-01-01

## 2021-01-01 RX ORDER — HYDROXYZINE HYDROCHLORIDE 25 MG/1
25 TABLET, FILM COATED ORAL 3 TIMES DAILY PRN
Qty: 90 TABLET | Refills: 5 | Status: ON HOLD | OUTPATIENT
Start: 2021-01-01 | End: 2021-01-01

## 2021-01-01 RX ORDER — ALBUTEROL SULFATE 2.5 MG/3ML
2.5 SOLUTION RESPIRATORY (INHALATION) EVERY 4 HOURS PRN
Status: CANCELLED | OUTPATIENT
Start: 2021-01-01

## 2021-01-01 RX ORDER — MORPHINE SULFATE 2 MG/ML
2 INJECTION, SOLUTION INTRAMUSCULAR; INTRAVENOUS
Status: DISCONTINUED | OUTPATIENT
Start: 2021-01-01 | End: 2021-01-01 | Stop reason: HOSPADM

## 2021-01-01 RX ORDER — ARMODAFINIL 150 MG/1
150 TABLET ORAL DAILY
Qty: 30 TABLET | Refills: 3 | Status: SHIPPED | OUTPATIENT
Start: 2021-01-01 | End: 2021-01-01 | Stop reason: SDUPTHER

## 2021-01-01 RX ORDER — ALBUTEROL SULFATE 90 UG/1
1 AEROSOL, METERED RESPIRATORY (INHALATION) EVERY 4 HOURS PRN
COMMUNITY

## 2021-01-01 RX ORDER — HEPARIN SODIUM 5000 [USP'U]/ML
60 INJECTION, SOLUTION INTRAVENOUS; SUBCUTANEOUS AS NEEDED
Status: DISCONTINUED | OUTPATIENT
Start: 2021-01-01 | End: 2021-01-01

## 2021-01-01 RX ORDER — HYDROXYZINE HYDROCHLORIDE 25 MG/1
25 TABLET, FILM COATED ORAL NIGHTLY
COMMUNITY

## 2021-01-01 RX ORDER — DIAZEPAM 10 MG/1
10 TABLET ORAL 2 TIMES DAILY PRN
COMMUNITY

## 2021-01-01 RX ORDER — LORAZEPAM 2 MG/ML
2 INJECTION INTRAMUSCULAR
Status: DISCONTINUED | OUTPATIENT
Start: 2021-01-01 | End: 2021-01-01 | Stop reason: HOSPADM

## 2021-01-01 RX ORDER — MORPHINE SULFATE 2 MG/ML
1 INJECTION, SOLUTION INTRAMUSCULAR; INTRAVENOUS ONCE
Status: COMPLETED | OUTPATIENT
Start: 2021-01-01 | End: 2021-01-01

## 2021-01-01 RX ORDER — FUROSEMIDE 10 MG/ML
20 INJECTION INTRAMUSCULAR; INTRAVENOUS ONCE
Status: COMPLETED | OUTPATIENT
Start: 2021-01-01 | End: 2021-01-01

## 2021-01-01 RX ORDER — HEPARIN SODIUM 5000 [USP'U]/ML
60 INJECTION, SOLUTION INTRAVENOUS; SUBCUTANEOUS ONCE
Status: COMPLETED | OUTPATIENT
Start: 2021-01-01 | End: 2021-01-01

## 2021-01-01 RX ORDER — ONDANSETRON 2 MG/ML
INJECTION INTRAMUSCULAR; INTRAVENOUS
Status: COMPLETED
Start: 2021-01-01 | End: 2021-01-01

## 2021-01-01 RX ORDER — HYDROCODONE BITARTRATE AND ACETAMINOPHEN 7.5; 325 MG/1; MG/1
1 TABLET ORAL 2 TIMES DAILY PRN
Status: CANCELLED | OUTPATIENT
Start: 2021-01-01

## 2021-01-01 RX ORDER — METOPROLOL SUCCINATE 25 MG/1
25 TABLET, EXTENDED RELEASE ORAL 2 TIMES DAILY
COMMUNITY

## 2021-01-01 RX ORDER — CLOBETASOL PROPIONATE 0.5 MG/G
1 CREAM TOPICAL DAILY
COMMUNITY

## 2021-01-01 RX ORDER — MIDAZOLAM HYDROCHLORIDE 2 MG/2ML
INJECTION, SOLUTION INTRAMUSCULAR; INTRAVENOUS
Status: DISCONTINUED
Start: 2021-01-01 | End: 2021-01-01 | Stop reason: WASHOUT

## 2021-01-01 RX ORDER — SODIUM CHLORIDE 9 MG/ML
125 INJECTION, SOLUTION INTRAVENOUS CONTINUOUS
Status: DISCONTINUED | OUTPATIENT
Start: 2021-01-01 | End: 2021-01-01

## 2021-01-01 RX ORDER — KETOCONAZOLE 20 MG/ML
1 SHAMPOO TOPICAL DAILY
COMMUNITY

## 2021-01-01 RX ORDER — LORATADINE 10 MG/1
CAPSULE, LIQUID FILLED ORAL
Status: ON HOLD | COMMUNITY
End: 2021-01-01

## 2021-01-01 RX ORDER — HYDROXYZINE HYDROCHLORIDE 25 MG/1
25 TABLET, FILM COATED ORAL NIGHTLY
Status: CANCELLED | OUTPATIENT
Start: 2021-01-01

## 2021-01-01 RX ORDER — HEPARIN SODIUM 10000 [USP'U]/100ML
12 INJECTION, SOLUTION INTRAVENOUS
Status: DISCONTINUED | OUTPATIENT
Start: 2021-01-01 | End: 2021-01-01

## 2021-01-01 RX ORDER — CLOBETASOL PROPIONATE 0.5 MG/G
1 CREAM TOPICAL DAILY
Status: CANCELLED | OUTPATIENT
Start: 2021-01-01

## 2021-01-01 RX ORDER — DIAZEPAM 5 MG/1
10 TABLET ORAL 2 TIMES DAILY PRN
Status: CANCELLED | OUTPATIENT
Start: 2021-01-01

## 2021-01-01 RX ORDER — DEXTROSE MONOHYDRATE 25 G/50ML
50 INJECTION, SOLUTION INTRAVENOUS ONCE
Status: COMPLETED | OUTPATIENT
Start: 2021-01-01 | End: 2021-01-01

## 2021-01-01 RX ORDER — AMLODIPINE BESYLATE 5 MG/1
5 TABLET ORAL DAILY
COMMUNITY

## 2021-01-01 RX ORDER — ONDANSETRON 2 MG/ML
4 INJECTION INTRAMUSCULAR; INTRAVENOUS EVERY 6 HOURS PRN
Status: DISCONTINUED | OUTPATIENT
Start: 2021-01-01 | End: 2021-01-01 | Stop reason: HOSPADM

## 2021-01-01 RX ORDER — ALBUTEROL SULFATE 1.25 MG/3ML
1 SOLUTION RESPIRATORY (INHALATION) EVERY 6 HOURS PRN
Status: ON HOLD | COMMUNITY
End: 2021-01-01

## 2021-01-01 RX ORDER — PROMETHAZINE HYDROCHLORIDE 12.5 MG/1
12.5 SUPPOSITORY RECTAL EVERY 6 HOURS PRN
Status: DISCONTINUED | OUTPATIENT
Start: 2021-01-01 | End: 2021-01-01 | Stop reason: HOSPADM

## 2021-01-01 RX ORDER — GABAPENTIN 600 MG/1
600 TABLET ORAL 2 TIMES DAILY
COMMUNITY

## 2021-01-01 RX ORDER — MIDAZOLAM HYDROCHLORIDE 1 MG/ML
0.5 INJECTION INTRAMUSCULAR; INTRAVENOUS ONCE
Status: COMPLETED | OUTPATIENT
Start: 2021-01-01 | End: 2021-01-01

## 2021-01-01 RX ORDER — HYDROCODONE BITARTRATE AND ACETAMINOPHEN 7.5; 325 MG/1; MG/1
1 TABLET ORAL 2 TIMES DAILY PRN
COMMUNITY

## 2021-01-01 RX ORDER — DOXYCYCLINE HYCLATE 100 MG
100 TABLET ORAL 2 TIMES DAILY
Qty: 28 TABLET | Refills: 0 | Status: SHIPPED | OUTPATIENT
Start: 2021-01-01 | End: 2021-01-01

## 2021-01-01 RX ORDER — CALCIUM GLUCONATE 20 MG/ML
1 INJECTION, SOLUTION INTRAVENOUS ONCE
Status: COMPLETED | OUTPATIENT
Start: 2021-01-01 | End: 2021-01-01

## 2021-01-01 RX ORDER — AMLODIPINE BESYLATE 5 MG/1
5 TABLET ORAL DAILY
Status: CANCELLED | OUTPATIENT
Start: 2021-01-01

## 2021-01-01 RX ORDER — ARMODAFINIL 150 MG/1
150 TABLET ORAL DAILY
Qty: 30 TABLET | Refills: 3 | Status: SHIPPED | OUTPATIENT
Start: 2021-01-01

## 2021-01-01 RX ORDER — SODIUM CHLORIDE 0.9 % (FLUSH) 0.9 %
10 SYRINGE (ML) INJECTION EVERY 12 HOURS SCHEDULED
Status: DISCONTINUED | OUTPATIENT
Start: 2021-01-01 | End: 2021-01-01 | Stop reason: HOSPADM

## 2021-01-01 RX ORDER — HEPARIN SODIUM 5000 [USP'U]/ML
30 INJECTION, SOLUTION INTRAVENOUS; SUBCUTANEOUS AS NEEDED
Status: DISCONTINUED | OUTPATIENT
Start: 2021-01-01 | End: 2021-01-01

## 2021-01-01 RX ADMIN — IPRATROPIUM BROMIDE 0.5 MG: 0.5 SOLUTION RESPIRATORY (INHALATION) at 13:22

## 2021-01-01 RX ADMIN — ONDANSETRON 4 MG: 2 INJECTION INTRAMUSCULAR; INTRAVENOUS at 21:33

## 2021-01-01 RX ADMIN — MIDAZOLAM HYDROCHLORIDE 0.5 MG: 1 INJECTION, SOLUTION INTRAMUSCULAR; INTRAVENOUS at 19:55

## 2021-01-01 RX ADMIN — DIGOXIN 250 MCG: 0.25 INJECTION INTRAMUSCULAR; INTRAVENOUS at 23:04

## 2021-01-01 RX ADMIN — SODIUM CHLORIDE 1000 ML: 9 INJECTION, SOLUTION INTRAVENOUS at 17:35

## 2021-01-01 RX ADMIN — FUROSEMIDE 20 MG: 10 INJECTION, SOLUTION INTRAMUSCULAR; INTRAVENOUS at 08:21

## 2021-01-01 RX ADMIN — FLUDEOXYGLUCOSE F18 1 DOSE: 300 INJECTION INTRAVENOUS at 11:58

## 2021-01-01 RX ADMIN — VANCOMYCIN HYDROCHLORIDE 1000 MG: 1 INJECTION, POWDER, LYOPHILIZED, FOR SOLUTION INTRAVENOUS at 20:25

## 2021-01-01 RX ADMIN — IOPAMIDOL 75 ML: 755 INJECTION, SOLUTION INTRAVENOUS at 19:42

## 2021-01-01 RX ADMIN — VANCOMYCIN HYDROCHLORIDE 1000 MG: 1 INJECTION, POWDER, LYOPHILIZED, FOR SOLUTION INTRAVENOUS at 19:05

## 2021-01-01 RX ADMIN — SODIUM CHLORIDE 15 MG/HR: 900 INJECTION, SOLUTION INTRAVENOUS at 03:51

## 2021-01-01 RX ADMIN — HUMAN INSULIN 10 UNITS: 100 INJECTION, SOLUTION SUBCUTANEOUS at 20:07

## 2021-01-01 RX ADMIN — HEPARIN SODIUM 2800 UNITS: 5000 INJECTION INTRAVENOUS; SUBCUTANEOUS at 14:06

## 2021-01-01 RX ADMIN — CEFEPIME HYDROCHLORIDE 2 G: 2 INJECTION, POWDER, FOR SOLUTION INTRAVENOUS at 06:18

## 2021-01-01 RX ADMIN — PERMETHRIN CREAM 5% W/W: 50 CREAM TOPICAL at 19:42

## 2021-01-01 RX ADMIN — IPRATROPIUM BROMIDE 0.5 MG: 0.5 SOLUTION RESPIRATORY (INHALATION) at 13:40

## 2021-01-01 RX ADMIN — MORPHINE SULFATE 2 MG: 2 INJECTION, SOLUTION INTRAMUSCULAR; INTRAVENOUS at 21:23

## 2021-01-01 RX ADMIN — SODIUM CHLORIDE 5 MG/HR: 900 INJECTION, SOLUTION INTRAVENOUS at 17:49

## 2021-01-01 RX ADMIN — IPRATROPIUM BROMIDE 0.5 MG: 0.5 SOLUTION RESPIRATORY (INHALATION) at 18:27

## 2021-01-01 RX ADMIN — CEFEPIME HYDROCHLORIDE 2 G: 2 INJECTION, POWDER, FOR SOLUTION INTRAVENOUS at 20:25

## 2021-01-01 RX ADMIN — SODIUM CHLORIDE, PRESERVATIVE FREE 10 ML: 5 INJECTION INTRAVENOUS at 20:25

## 2021-01-01 RX ADMIN — CALCIUM GLUCONATE 1 G: 20 INJECTION, SOLUTION INTRAVENOUS at 21:09

## 2021-01-01 RX ADMIN — IPRATROPIUM BROMIDE 0.5 MG: 0.5 SOLUTION RESPIRATORY (INHALATION) at 19:05

## 2021-01-01 RX ADMIN — SODIUM CHLORIDE 15 MG/HR: 900 INJECTION, SOLUTION INTRAVENOUS at 21:01

## 2021-01-01 RX ADMIN — IPRATROPIUM BROMIDE 0.5 MG: 0.5 SOLUTION RESPIRATORY (INHALATION) at 06:54

## 2021-01-01 RX ADMIN — DIGOXIN 250 MCG: 0.25 INJECTION INTRAMUSCULAR; INTRAVENOUS at 08:13

## 2021-01-01 RX ADMIN — APIXABAN 5 MG: 5 TABLET, FILM COATED ORAL at 20:24

## 2021-01-01 RX ADMIN — SODIUM CHLORIDE, PRESERVATIVE FREE 10 ML: 5 INJECTION INTRAVENOUS at 09:27

## 2021-01-01 RX ADMIN — DEXTROSE MONOHYDRATE 50 ML: 25 INJECTION, SOLUTION INTRAVENOUS at 20:07

## 2021-01-01 RX ADMIN — SODIUM CHLORIDE, PRESERVATIVE FREE 10 ML: 5 INJECTION INTRAVENOUS at 08:21

## 2021-01-01 RX ADMIN — MORPHINE SULFATE 2 MG: 2 INJECTION, SOLUTION INTRAMUSCULAR; INTRAVENOUS at 13:52

## 2021-01-01 RX ADMIN — DIGOXIN 250 MCG: 0.25 INJECTION INTRAMUSCULAR; INTRAVENOUS at 22:22

## 2021-01-01 RX ADMIN — CEFEPIME HYDROCHLORIDE 2 G: 2 INJECTION, POWDER, FOR SOLUTION INTRAVENOUS at 18:43

## 2021-01-01 RX ADMIN — VANCOMYCIN HYDROCHLORIDE 1000 MG: 1 INJECTION, POWDER, LYOPHILIZED, FOR SOLUTION INTRAVENOUS at 17:10

## 2021-01-01 RX ADMIN — MORPHINE SULFATE 1 MG: 2 INJECTION, SOLUTION INTRAMUSCULAR; INTRAVENOUS at 00:59

## 2021-01-01 RX ADMIN — SODIUM CHLORIDE, PRESERVATIVE FREE 10 ML: 5 INJECTION INTRAVENOUS at 20:06

## 2021-01-01 RX ADMIN — PROMETHAZINE HYDROCHLORIDE 12.5 MG: 12.5 SUPPOSITORY RECTAL at 01:00

## 2021-01-01 RX ADMIN — HEPARIN SODIUM 12 UNITS/KG/HR: 10000 INJECTION, SOLUTION INTRAVENOUS at 14:07

## 2021-01-01 RX ADMIN — MIDAZOLAM HYDROCHLORIDE 0.5 MG: 1 INJECTION INTRAMUSCULAR; INTRAVENOUS at 19:55

## 2021-01-01 RX ADMIN — CEFEPIME HYDROCHLORIDE 2 G: 2 INJECTION, POWDER, FOR SOLUTION INTRAVENOUS at 18:29

## 2021-01-01 RX ADMIN — SODIUM CHLORIDE, PRESERVATIVE FREE 10 ML: 5 INJECTION INTRAVENOUS at 03:51

## 2021-01-01 RX ADMIN — SODIUM CHLORIDE 125 ML/HR: 9 INJECTION, SOLUTION INTRAVENOUS at 17:35

## 2021-01-01 RX ADMIN — HEPARIN SODIUM 2800 UNITS: 5000 INJECTION INTRAVENOUS; SUBCUTANEOUS at 21:12

## 2021-01-01 RX ADMIN — MORPHINE SULFATE 1 MG: 2 INJECTION, SOLUTION INTRAMUSCULAR; INTRAVENOUS at 03:51

## 2021-01-01 RX ADMIN — IOPAMIDOL 94 ML: 755 INJECTION, SOLUTION INTRAVENOUS at 20:49

## 2021-06-25 NOTE — PROGRESS NOTES
Name:  Sobeida Light  :  1950  Date:  2021     REFERRING PHYSICIAN    PRIMARY CARE PROVIDER  Meagan Pérez APRN    REASON FOR CONSULTATION  1. Malignant neoplasm of lung, unspecified laterality, unspecified part of lung (CMS/HCC)      CHIEF COMPLAINT  Itchy lesions/blisters on her scalp (generally underneath her hair) for months.    Dear Ms. Pérez,    HISTORY OF PRESENT ILLNESS:   I saw Ms. Light in initial consultation today in our medical oncology clinic. As you are aware, she is a pleasant, 71 y.o., white female with a history of hypertension, COPD and lung cancer who was initially diagnosed with the latter (stage IIIA) in ~. She underwent concomitant chemotherapy and radiation between 2015 and 2016 (though another oncologist's office) for localized disease. She apparently did well until 2019, when she was found to have recurrence(s) to her right lung and both hilar areas. She was started on palliative Keytruda (again, through another oncologist's office) and reportedly did overall well with this treatment until the latter part of . In ~Sept/Oct 2020, a repeat NM PET scan purportedly showed progressive disease, and a switch to palliative gemcitabine was offered. She has apparently not been back to see her prior oncologist (or any other oncologist) since then. She now presents to our clinic today to resume this (oncologic) management.    INTERIM HISTORY:  Ms. Light presents to clinic today for initial consultation by herself. Despite not specifically following with anyone regarding her lung cancer since ~2020, she reports that she has been doing overall well during that time. Her only complaint today is of longstanding problems with several, itchy lesions/blisters on her scalp (generally underneath her hair) that wax and wane. She has been putting mupirocin ointment on them, which she thinks helps some (but only transiently). Thinking back, she believes that  "they first became a problem shortly after she started Keytruda. She does have a history of psoriasis; but she states that her psoriasis has recently been a minimal issue, and these lesions (the ones on her scalp) are completely different from her psoriasis lesions.  Her dermatologist recently biopsied several of these lesions for further evaluation (she has yet to return to follow up on these results). She does have a powerport in her left side that was placed at least a couple of years ago, but which has not been flushed since October 2021. The fact that it is \"pooching\" out tends to bother her, and she would not be opposed to having it removed/replaced, if possible.    Past Medical History:   Diagnosis Date   • Ankle fracture    • Anxiety    • Anxiety    • Arthritis    • COPD (chronic obstructive pulmonary disease) (CMS/[facility])    • Depression    • Hypertension    • Lung cancer (CMS/[facility])    • Wrist fracture        Past Surgical History:   Procedure Laterality Date   • ANKLE SURGERY     • CHOLECYSTECTOMY     • TONSILLECTOMY         Social History     Socioeconomic History   • Marital status:      Spouse name: Not on file   • Number of children: Not on file   • Years of education: Not on file   • Highest education level: Not on file   Tobacco Use   • Smoking status: Current Every Day Smoker     Packs/day: 0.50     Types: Cigarettes   • Smokeless tobacco: Never Used   Vaping Use   • Vaping Use: Never used   Substance and Sexual Activity   • Alcohol use: No   • Drug use: No   • Sexual activity: Defer       History reviewed. No pertinent family history.    Allergies   Allergen Reactions   • Azithromycin        Current Outpatient Medications   Medication Sig Dispense Refill   • albuterol (ACCUNEB) 1.25 MG/3ML nebulizer solution Take 1 ampule by nebulization Every 6 (Six) Hours As Needed for Wheezing.     • albuterol (PROAIR RESPICLICK) 108 (90 Base) MCG/ACT inhaler Inhale Every 4 (Four) Hours As Needed for Wheezing. "     • DiazePAM (VALIUM PO) Take  by mouth.     • hydrochlorothiazide (MICROZIDE) 12.5 MG capsule Take 12.5 mg by mouth Daily.     • HYDROcodone-acetaminophen (NORCO) 7.5-325 MG per tablet Take 1 tablet by mouth Every 6 (Six) Hours As Needed for moderate pain (4-6). 24 tablet 0   • Loratadine 10 MG capsule Take  by mouth.     • metoprolol succinate XL (TOPROL-XL) 25 MG 24 hr tablet Take 25 mg by mouth 2 (two) times a day.     • ondansetron ODT (ZOFRAN-ODT) 4 MG disintegrating tablet Take 1 tablet by mouth Every 6 (Six) Hours As Needed for Nausea. 15 tablet 0   • diclofenac (VOLTAREN) 75 MG EC tablet Take 1 tablet by mouth 2 (Two) Times a Day As Needed (moderate pain). 20 tablet 0   • doxycycline (MONODOX) 100 MG capsule Take 1 capsule by mouth 2 (Two) Times a Day. 20 capsule 0   • fosinopril (MONOPRIL) 10 MG tablet Take 10 mg by mouth Daily.     • hydrOXYzine (ATARAX) 25 MG tablet Take 1 tablet by mouth 3 (Three) Times a Day As Needed for Itching. 90 tablet 5   • mupirocin (BACTROBAN) 2 % nasal ointment 1 application into the nostril(s) as directed by provider 2 (Two) Times a Day. Affected area, not the nostrils (as autofilled by our EMR) 22 g 3   • penicillin v potassium (VEETID) 500 MG tablet Take 1 tablet by mouth 4 (Four) Times a Day. 28 tablet 0   • Simethicone (MAALOX ANTI-GAS PO) Take  by mouth.       No current facility-administered medications for this visit.     REVIEW OF SYSTEMS  CONSTITUTIONAL:  No fever, chills or night sweats.  EYES:  No blurry vision, diplopia or other vision changes.  ENT:  No hearing loss, nosebleeds or sore throat.  CARDIOVASCULAR:  No palpitations, arrhythmia, syncopal episodes or edema.  PULMONARY:  No hemoptysis, wheezing or chronic cough. Mild, chronic, shortness of breath, particularly with exertion, recently stable.  GASTROINTESTINAL:  No nausea or vomiting. No constipation or diarrhea. No abdominal pain.  GENITOURINARY:  No hematuria, kidney stones or frequent  "urination.  MUSCULOSKELETAL:  No joint or back pains.  INTEGUMENTARY: As per the HPI above.  ENDOCRINE:  No excessive thirst or hot flashes.  HEMATOLOGIC:  No history of free bleeding, spontaneous bleeding or clotting.  IMMUNOLOGIC:  No allergies or frequent infections.  NEUROLOGIC: No numbness, tingling, seizures or weakness.  PSYCHIATRIC:  No anxiety or depression.    PHYSICAL EXAMINATION  BP (!) 183/84   Pulse 85   Temp 97.2 °F (36.2 °C) (Temporal)   Resp 18   Ht 157.5 cm (62\")   Wt 66 kg (145 lb 9.6 oz)   SpO2 94%   BMI 26.63 kg/m²     Pain Score:  Pain Score    06/25/21 1249   PainSc: 10-Worst pain ever   PainLoc: Back       PHQ-Score Total:  PHQ-9 Total Score: 16    GENERAL:  A well-developed, well-nourished, white female in no acute distress.  HEENT:  Pupils equally round and reactive to light.  Extraocular muscles intact.  CARDIOVASCULAR:  Regular rate and rhythm.  No murmurs, gallops or rubs.  LUNGS:  Clear to auscultation bilaterally.  ABDOMEN:  Soft, nontender, nondistended with positive bowel sounds.  EXTREMITIES:  No clubbing, cyanosis or edema bilaterally.  SKIN:  No petechiae. Scalp: Several ~1 cm or less, pustular, blisters in various states of evolution. Left-sided powerport in place.  NEURO:  Cranial nerves grossly intact. No focal deficits.  PSYCH:  Alert and oriented x3.    LABORATORY    Lab Results   Component Value Date    WBC 11.62 (H) 05/18/2021    HGB 15.5 05/18/2021    HCT 51.2 (H) 05/18/2021    MCV 90.5 05/18/2021     05/18/2021    NEUTROABS 8.32 (H) 05/18/2021       Lab Results   Component Value Date     05/18/2021    K 4.1 05/18/2021    CL 97 (L) 05/18/2021    CO2 32.4 (H) 05/18/2021    BUN 14 05/18/2021    CREATININE 1.11 (H) 05/18/2021    GLUCOSE 94 05/18/2021    CALCIUM 9.4 05/18/2021    AST 22 05/18/2021    ALT 16 05/18/2021    ALKPHOS 151 (H) 05/18/2021    BILITOT 0.4 05/18/2021    PROTEINTOT 7.6 05/18/2021    ALBUMIN 3.51 05/18/2021     CBC (05/18/2021): WBCs: " 11.62; HgB: 15.5; Hct: 51.2; platelets: 294    IMAGING  NM PET scan (05/22/2019, per prior oncologist's clinic note):  Impression: [Hypermetabolic mass in the anterior right portion of the right upper lobe maximum SUV of 5.4 corresponding to soft tissue mass 1.4 x 1.2 cm. Also multiple foci of abnormal hypermetabolism seen within mediastinum the focus within the prevascular region demonstrates a mSUV of 10.2. Finding corresponds to 1.3 cm lymph node. Another focus with SUV of 7.9 and left suprahilar region corresponding to lymph node 1.7 cm also a small hypermetabolic focus in the left infrahilar region with SUV of 3.8.    PATHOLOGY  Lung, FNA, right upper lobe (06/25/2019): Malignant.    Lung, biopsy, right upper lobe nodule (06/25/2019):  Moderately differentiated squamous cell carcinoma.     IMPRESSION AND PLAN  Ms. Light is a 71 y.o., white female with:  1. Squamous cell lung carcinoma: Initially diagnosed in 2015 with stage IIIA disease and status post concomitant chemo/XRT. Did well until ~late 2019, when he was diagnosed with a (now metastatic) recurrence. Again apparently did well for the next ~year on palliative pembrolizumab (Keytruda), but this was stopped by early Fall 2020 when a repeat NM PET scan was consistent with disease progression. She has not had any specific, cancer-directed treatment (or imaging) since then (she was offered a change in palliative treatment to gemcitabine; however, she ultimately elected to not follow up with her prior oncologist). Today, ~eight (8) months later, other than issue #2 (see below), she feels overall fairly well and has no specific complaints. The initial step in reestablishing routine, oncologic follow up (now through our clinic) will be to reassess the current status of her disease (while issue #2 continues to be further addressed). We will see her back in our clinic in two weeks with a CBC, CMP and new, baseline CTs of the chest, abdomen and pelvis.  2. Scalp  lesions: Reportedly first developed shortly after she started palliative pembrolizumab (Keytruda) in late 2020 but overall unchanged despite not receiving any cycles of Keytruda for well over ~nine (9) months now. Follow up with dermatology shortly, as previously planned, with (hopefully) the results of last week's biopsies. Refills of mupirocin ointment and a Rx for hydroxyzine 25 mg q8hr prn were also provided today.  The patient was in agreement with these plans.    It is a pleasure to participate in Mr. Light's care. Please do not hesitate to call with any questions or concerns that you may have.    A total of 60 minutes were spent coordinating this patient’s care in clinic today; more than 50% of this time was face-to-face with the patient, reviewing her medical history, discussing the diagnosis and, in general terms, its prognosis and counseling on the current evaluation, treatment and followup plan. All questions were answered to her satisfaction.    FOLLOW UP  With dermatology, as previously planned. Rxs for Mupirocin topical ointments and hydroxyzine 25 mg PO q8hr prn both provided today. Return to clinic in 2 weeks with a CBC, CMP and CTs of the chest, abdomen and pelvis without contrast.            This document was electronically signed by BELLA Zavala MD June 25, 2021 15:55 EDT      CC: JIGAR Mckenna

## 2021-06-28 NOTE — PROGRESS NOTES
Patient completed her PHQ depression screening.    PHQ-9 Total Score:  16    Patient seen in office visit this date by provider.    SS will follow.

## 2021-06-30 NOTE — TELEPHONE ENCOUNTER
Caller: JUN    Relationship: PATIENT    Best call back number: [phone]     What is the best time to reach you: ANYTIME    What was the call regarding: SHE THINKS SHE MAY NEED SOME SORT OF SCAN ON HER NECK. SHE STATES SHE FELL FROM A LADDER A COUPLE OF YEARS AGO, AND IT HAS BEEN POPPING AND CRACKING SINCE THEN. SHE SAYS AS OF TODAY, SHE NOTICED A MUSCLE RAISED UP ON BACK OF NECK.    Do you require a callback: YES

## 2021-07-09 NOTE — PROGRESS NOTES
Name:  Sobeida Light  :  1950  Date:  2021     REFERRING PHYSICIAN    PRIMARY CARE PROVIDER  Meagan Pérez APRN    REASON FOR FOLLOWUP  1. Malignant neoplasm of lung, unspecified laterality, unspecified part of lung (CMS/HCC)      CHIEF COMPLAINT  Fatigue.    Dear Ms. Pérez,    HISTORY OF PRESENT ILLNESS:   I saw Ms. Light in follow up today in our medical oncology clinic. As you are aware, she is a pleasant, 71 y.o., white female with a history of hypertension, COPD and lung cancer who was initially diagnosed with the latter (stage IIIA) in ~. She underwent concomitant chemotherapy and radiation between 2015 and 2016 (though another oncologist's office) for localized disease. She apparently did well until 2019, when she was found to have recurrence(s) to her right lung and both hilar areas. She was started on palliative Keytruda (again, through another oncologist's office) and reportedly did overall well with this treatment until the latter part of . In ~Sept/Oct 2020, a repeat NM PET scan purportedly showed progressive disease, and a switch to palliative gemcitabine was offered. Following this, she did not followup with her prior oncologist (or any other oncologist) until 2021, when she was referred to our clinic to reestablish routine, oncologic management and followup.    INTERIM HISTORY:  Ms. Light returns to clinic today for followup by herself. Despite not specifically following with anyone regarding her lung cancer between 2020 and 2021, she has been doing overall fairly well from a malignancy standpoint. The itchy lesions/blisters on her scalp (generally underneath her hair) continue to wax and wane (and are currently waning). She has been putting mupirocin ointment on them, which she still thinks helps some (but only transiently). Thinking back, she believes that they first became a problem shortly after she started Keytruda. She does have a  history of psoriasis; but she reiterates that her psoriasis has recently been a minimal issue, and these lesions (the ones on her scalp) are completely different from her psoriasis lesions.  Her dermatologist biopsied several of these lesions for further evaluation in June 2021, but she has yet to follow up on these results. Her primary complaint today is of persistent fatigue. She has no new complaints.    Past Medical History:   Diagnosis Date   • Ankle fracture    • Anxiety    • Anxiety    • Arthritis    • COPD (chronic obstructive pulmonary disease) (CMS/[facility])    • Depression    • Hypertension    • Lung cancer (CMS/[facility])    • Wrist fracture        Past Surgical History:   Procedure Laterality Date   • ANKLE SURGERY     • CHOLECYSTECTOMY     • TONSILLECTOMY         Social History     Socioeconomic History   • Marital status:      Spouse name: Not on file   • Number of children: Not on file   • Years of education: Not on file   • Highest education level: Not on file   Tobacco Use   • Smoking status: Current Every Day Smoker     Packs/day: 0.50     Types: Cigarettes   • Smokeless tobacco: Never Used   Vaping Use   • Vaping Use: Never used   Substance and Sexual Activity   • Alcohol use: No   • Drug use: No   • Sexual activity: Defer       History reviewed. No pertinent family history.    Allergies   Allergen Reactions   • Azithromycin        Current Outpatient Medications   Medication Sig Dispense Refill   • albuterol (ACCUNEB) 1.25 MG/3ML nebulizer solution Take 1 ampule by nebulization Every 6 (Six) Hours As Needed for Wheezing.     • albuterol (PROAIR RESPICLICK) 108 (90 Base) MCG/ACT inhaler Inhale Every 4 (Four) Hours As Needed for Wheezing.     • DiazePAM (VALIUM PO) Take  by mouth.     • diclofenac (VOLTAREN) 75 MG EC tablet Take 1 tablet by mouth 2 (Two) Times a Day As Needed (moderate pain). 20 tablet 0   • doxycycline (MONODOX) 100 MG capsule Take 1 capsule by mouth 2 (Two) Times a Day. 20 capsule 0    • fosinopril (MONOPRIL) 10 MG tablet Take 10 mg by mouth Daily.     • hydrochlorothiazide (MICROZIDE) 12.5 MG capsule Take 12.5 mg by mouth Daily.     • HYDROcodone-acetaminophen (NORCO) 7.5-325 MG per tablet Take 1 tablet by mouth Every 6 (Six) Hours As Needed for moderate pain (4-6). 24 tablet 0   • hydrOXYzine (ATARAX) 25 MG tablet Take 1 tablet by mouth 3 (Three) Times a Day As Needed for Itching. 90 tablet 5   • Loratadine 10 MG capsule Take  by mouth.     • metoprolol succinate XL (TOPROL-XL) 25 MG 24 hr tablet Take 25 mg by mouth 2 (two) times a day.     • mupirocin (BACTROBAN) 2 % nasal ointment 1 application into the nostril(s) as directed by provider 2 (Two) Times a Day. Affected area, not the nostrils (as autofilled by our EMR) 22 g 3   • ondansetron ODT (ZOFRAN-ODT) 4 MG disintegrating tablet Take 1 tablet by mouth Every 6 (Six) Hours As Needed for Nausea. 15 tablet 0   • penicillin v potassium (VEETID) 500 MG tablet Take 1 tablet by mouth 4 (Four) Times a Day. 28 tablet 0   • Simethicone (MAALOX ANTI-GAS PO) Take  by mouth.     • armodafinil (Nuvigil) 150 MG tablet Take 1 tablet by mouth Daily. 30 tablet 3     No current facility-administered medications for this visit.     REVIEW OF SYSTEMS  CONSTITUTIONAL:  No fever, chills or night sweats.  EYES:  No blurry vision, diplopia or other vision changes.  ENT:  No hearing loss, nosebleeds or sore throat.  CARDIOVASCULAR:  No palpitations, arrhythmia, syncopal episodes or edema.  PULMONARY:  No hemoptysis, wheezing or chronic cough. Mild, chronic, shortness of breath, particularly with exertion, recently still stable.  GASTROINTESTINAL:  No nausea or vomiting. No constipation or diarrhea. No abdominal pain.  GENITOURINARY:  No hematuria, kidney stones or frequent urination.  MUSCULOSKELETAL:  No joint or back pains.  INTEGUMENTARY: As per the HPI above.  ENDOCRINE:  No excessive thirst or hot flashes.  HEMATOLOGIC:  No history of free bleeding,  spontaneous bleeding or clotting.  IMMUNOLOGIC:  No allergies or frequent infections.  NEUROLOGIC: No numbness, tingling, seizures or weakness.  PSYCHIATRIC:  No anxiety or depression.    PHYSICAL EXAMINATION  BP (!) 181/87   Pulse 94   Temp 97.5 °F (36.4 °C) (Temporal)   Resp 18   SpO2 92%     Pain Score:  Pain Score    07/09/21 1108   PainSc: 0-No pain       PHQ-Score Total:  PHQ-9 Total Score:      GENERAL:  A well-developed, well-nourished, white female in no acute distress.  HEENT:  Pupils equally round and reactive to light. Extraocular muscles intact.  CARDIOVASCULAR:  Regular rate and rhythm.  No murmurs, gallops or rubs.  LUNGS:  Clear to auscultation bilaterally.  ABDOMEN:  Soft, nontender, nondistended with positive bowel sounds.  EXTREMITIES:  No clubbing, cyanosis or edema bilaterally.  SKIN:  No petechiae. Scalp: Several ~1 cm or less, pustular, blisters in various states of evolution. Left-sided powerport still in place.  NEURO:  Cranial nerves grossly intact. No focal deficits.  PSYCH:  Alert and oriented x3.    LABORATORY  Lab Results   Component Value Date    WBC 10.32 07/06/2021    HGB 15.2 07/06/2021    HCT 48.1 (H) 07/06/2021    MCV 88.9 07/06/2021     07/06/2021    NEUTROABS 8.39 (H) 07/06/2021       Lab Results   Component Value Date     07/06/2021    K 3.8 07/06/2021    CL 98 07/06/2021    CO2 29.4 (H) 07/06/2021    BUN 11 07/06/2021    CREATININE 0.91 07/06/2021    GLUCOSE 94 07/06/2021    CALCIUM 9.3 07/06/2021    AST 12 07/06/2021    ALT 11 07/06/2021    ALKPHOS 112 07/06/2021    BILITOT 0.4 07/06/2021    PROTEINTOT 7.6 07/06/2021    ALBUMIN 3.80 07/06/2021     CBC (07/06/2021): WBCs: 10.32; HgB: 15.2; Hct: 48.1; platelets: 257  CBC (05/18/2021): WBCs: 11.62; HgB: 15.5; Hct: 51.2; platelets: 294    IMAGING  NM PET scan (05/22/2019, per prior oncologist's clinic note):  Impression: [Hypermetabolic mass in the anterior right portion of the right upper lobe maximum SUV of 5.4  corresponding to soft tissue mass 1.4 x 1.2 cm. Also multiple foci of abnormal hypermetabolism seen within mediastinum the focus within the prevascular region demonstrates a mSUV of 10.2. Finding corresponds to 1.3 cm lymph node. Another focus with SUV of 7.9 and left suprahilar region corresponding to lymph node 1.7 cm also a small hypermetabolic focus in the left infrahilar region with SUV of 3.8.    CT chest without contrast (07/06/2021):  Impression: Spiculated right upper lobe mass measuring 3.8 cm. Adjacent, subpleural nodule measuring 7 mm. Soft tissue density is noted in the region of right hilum that may represent component of soft tissue mass. A smaller, right upper lobe spiculated pulmonary nodule measures 6 mm.    CT abdomen and pelvis without contrast (07/06/2021):  Impression: No acute findings in the abdomen or pelvis.    PATHOLOGY  Lung, FNA, right upper lobe (06/25/2019): Malignant.    Lung, biopsy, right upper lobe nodule (06/25/2019):  Moderately differentiated squamous cell carcinoma.     IMPRESSION AND PLAN  Ms. Light is a 71 y.o., white female with:  1. Squamous cell lung carcinoma: Initially diagnosed in 2015 with stage IIIA disease and status post concomitant chemo/XRT. Did well until ~late 2019, when she was diagnosed with a (now metastatic) recurrence. Again apparently did well for the next ~year on palliative pembrolizumab (Keytruda), but this was stopped by early Fall 2020 when a repeat NM PET scan was consistent with disease progression. She has not had any specific, cancer-directed treatment since then (she was offered a change in palliative treatment to gemcitabine by her previous oncologist; however, she ultimately elected to not follow up with him or any other oncologist until she initially presented to our clinic in late June 2021). Today, ~nine (9) months later, other than issues #2 and #3 (see below), she continues to feel overall fairly well and has no specific complaints. New,  baseline CT scans (performed on 07/06/2021 and summarized above) now show a few nodules/masses in her right lung only, which, at this time, with no prior scan available for comparison, are all difficult to further characterize (benign lesions vs. prior treatment related changes vs. stable malignancy vs. progressive malignancy). Particularly since she has been doing clinically overall very well for nearly one full year now (without any specific treatment), and her breathing still remains at baseline, we will monitor these areas closely for now. We will see her back in our clinic in six weeks with repeat imaging (ideally with a NM PET scan; but if her insurance will not approve this, with a repeat chest CT instead).  2. Scalp lesions: Reportedly first developed shortly after she started palliative pembrolizumab (Keytruda) in late 2020 but overall unchanged despite not receiving any cycles of Keytruda for well over ~nine (9) months now. Follow up with dermatology, as previously planned, with (hopefully) the results of last month's biopsies. Continue to monitor.  3. Fatigue: Likely multifactorial. We will check a TSH and T4 level today. A Rx for Nuvigil 150 mg PO daily prn was also provided. Continue to monitor.  The patient was in agreement with these plans.    It is a pleasure to participate in Mr. Light's care. Please do not hesitate to call with any questions or concerns that you may have.    A total of 30 minutes were spent coordinating this patient’s care in clinic today; more than 50% of this time was face-to-face with the patient, reviewing her interim medical history, discussing the results of the recent repeat CT scans and counseling on the current evaluation, treatment and followup plan. All questions were answered to her satisfaction.    FOLLOW UP  With dermatology, as previously planned. Rx for Nuvigil 150 mg PO daily prn provided today. Return to clinic in 6 weeks (~mid-August 2021) with a CBC, CMP and NM  PET scan.            This document was electronically signed by BELLA Zavala MD July 9, 2021 11:36 EDT      CC: JIGAR Mckenna MD

## 2021-07-13 NOTE — TELEPHONE ENCOUNTER
Caller: SHY     Relationship to patient: [facility]     Best call back number: [phone]    SHY NEEDS THE PATIENTS LAST OFFICE NOTE 7-9 FAXED TO HER ATTENTION AT [phone]  THANK YOU

## 2021-07-22 NOTE — ED PROVIDER NOTES
Subjective   71-year-old female presents to the emergency room with multiple open lesions from neck down to her ankles for the past 6 months.  She states that she is unsure of what she may have gotten into.  She states she has been treated with antibiotics and creams nothing and no one is helping her.  She denies drug abuse.  She denies fever, body aches, chills.  He denies any alleviating factors despite the above-mentioned.  Denies any alleviating factors.  Denies any other complaints or concerns at this time.      History provided by:  Patient   used: No        Review of Systems   Constitutional: Negative.  Negative for fever.   HENT: Negative.    Respiratory: Negative.    Cardiovascular: Negative.  Negative for chest pain.   Gastrointestinal: Negative.  Negative for abdominal pain.   Endocrine: Negative.    Genitourinary: Negative.  Negative for dysuria.   Skin: Positive for wound.   Neurological: Negative.    Psychiatric/Behavioral: Negative.    All other systems reviewed and are negative.      Past Medical History:   Diagnosis Date   • Ankle fracture    • Anxiety    • Anxiety    • Arthritis    • COPD (chronic obstructive pulmonary disease) (CMS/[facility])    • Depression    • Hypertension    • Lung cancer (CMS/[facility])    • Wrist fracture        Allergies   Allergen Reactions   • Azithromycin        Past Surgical History:   Procedure Laterality Date   • ANKLE SURGERY     • CHOLECYSTECTOMY     • TONSILLECTOMY         No family history on file.    Social History     Socioeconomic History   • Marital status:      Spouse name: Not on file   • Number of children: Not on file   • Years of education: Not on file   • Highest education level: Not on file   Tobacco Use   • Smoking status: Current Every Day Smoker     Packs/day: 0.50     Types: Cigarettes   • Smokeless tobacco: Never Used   Vaping Use   • Vaping Use: Never used   Substance and Sexual Activity   • Alcohol use: No   • Drug use: No   •  Sexual activity: Defer           Objective   Physical Exam  Vitals and nursing note reviewed.   Constitutional:       General: She is not in acute distress.     Appearance: She is well-developed. She is not diaphoretic.   HENT:      Head: Normocephalic and atraumatic.      Right Ear: External ear normal.      Left Ear: External ear normal.      Nose: Nose normal.   Eyes:      Conjunctiva/sclera: Conjunctivae normal.      Pupils: Pupils are equal, round, and reactive to light.   Neck:      Vascular: No JVD.      Trachea: No tracheal deviation.   Cardiovascular:      Rate and Rhythm: Normal rate and regular rhythm.      Heart sounds: Normal heart sounds. No murmur heard.     Pulmonary:      Effort: Pulmonary effort is normal. No respiratory distress.      Breath sounds: Normal breath sounds. No wheezing.   Abdominal:      General: Bowel sounds are normal.      Palpations: Abdomen is soft.      Tenderness: There is no abdominal tenderness.   Musculoskeletal:         General: No deformity. Normal range of motion.      Cervical back: Normal range of motion and neck supple.   Skin:     General: Skin is warm and dry.      Coloration: Skin is not pale.      Findings: Lesion present. No erythema or rash.      Comments: Multiple open lesions on upper and lower extremities as well as trunk anteriorly and posteriorly   Neurological:      Mental Status: She is alert and oriented to person, place, and time.      Cranial Nerves: No cranial nerve deficit.   Psychiatric:         Behavior: Behavior normal.         Thought Content: Thought content normal.         Procedures           ED Course                                           MDM  Number of Diagnoses or Management Options  Rash: new and does not require workup  Risk of Complications, Morbidity, and/or Mortality  Presenting problems: low  Diagnostic procedures: minimal  Management options: moderate    Patient Progress  Patient progress: stable      Final diagnoses:   Rash        ED Disposition  ED Disposition     ED Disposition Condition Comment    Discharge Stable           Meagan Pérez, JIGAR  [address]  [facility] [address]  [phone]    In 2 days           Medication List      No changes were made to your prescriptions during this visit.          Julius Randall, PAMeredithC  07/22/21 2021

## 2021-07-22 NOTE — TELEPHONE ENCOUNTER
Caller: SHY    Relationship: PCP'S OFFICE    Best call back number: [phone]    What form or medical record are you requesting: LAST OFFICE NOTE FROM DOS 7/9/21    If fax, what is the fax number: [phone]    Additional notes: CONTACT SHY IF ANY ADDITIONAL QUESTIONS.     THANKS

## 2021-07-27 NOTE — TELEPHONE ENCOUNTER
Caller: SHY    Relationship to patient: PCP'S OFFICE    Best call back number: [phone]    SHY is needing: LAST OFFICE NOTE FROM DR. DESHPANDE FAXED OVER BEFORE 1 PM TODAY (7-) FOR PT APPT,    FAX NUMBER: [phone]

## 2021-08-03 NOTE — PROGRESS NOTES
"DATE:  8/3/2021    DIAGNOSIS: Lung cancer    CHIEF COMPLAINT:  Pruritic lesions on scalp and body      Interval History:  Ms. Light follows with Dr. Zavala for lung cancer and presents today demanding to be seen for pruritic lesions on scalp and body. She reports that these lesions are not new, this has been an ongoing problem for the approximately seven months. She reports that these lesions first developed after receiving Keytruda with her previous oncologist. She has been following with Dr. Valero and reports that she is not going back to her. 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 However, patient reports that she doesn't have stress/anxiety. She then proceeds to tell me that she has been sleeping in her car at night because she is afraid that her home is infested with \"MITES\". She reports that Bactroban ointment and atarax that Dr. Zavala gave her helped with her symptoms. She is asking for a referral to a different dermatologist today. She denies any other specific complaints today.    The following portions of the patient's history were reviewed and updated as appropriate: allergies, current medications, past family history, past medical history, past social history, past surgical history and problem list.    PAST MEDICAL HISTORY:  Past Medical History:   Diagnosis Date   • Ankle fracture    • Anxiety    • Anxiety    • Arthritis    • COPD (chronic obstructive pulmonary disease) (CMS/[facility])    • Depression    • Hypertension    • Lung cancer (CMS/[facility])    • Wrist fracture        PAST SURGICAL HISTORY:  Past Surgical History:   Procedure Laterality Date   • ANKLE SURGERY     • CHOLECYSTECTOMY     • TONSILLECTOMY         SOCIAL HISTORY:  Social History     Socioeconomic History   • Marital status:      Spouse name: Not on file   • Number of children: Not on file   • Years of education: Not on file   • Highest education level: Not " on file   Tobacco Use   • Smoking status: Current Every Day Smoker     Packs/day: 0.50     Types: Cigarettes   • Smokeless tobacco: Never Used   Vaping Use   • Vaping Use: Never used   Substance and Sexual Activity   • Alcohol use: No   • Drug use: No   • Sexual activity: Defer           FAMILY HISTORY:  History reviewed. No pertinent family history.      MEDICATIONS:  The current medication list was reviewed in the EMR    Current Outpatient Medications:   •  albuterol (ACCUNEB) 1.25 MG/3ML nebulizer solution, Take 1 ampule by nebulization Every 6 (Six) Hours As Needed for Wheezing., Disp: , Rfl:   •  albuterol (PROAIR RESPICLICK) 108 (90 Base) MCG/ACT inhaler, Inhale Every 4 (Four) Hours As Needed for Wheezing., Disp: , Rfl:   •  armodafinil (Nuvigil) 150 MG tablet, Take 1 tablet by mouth Daily., Disp: 30 tablet, Rfl: 3  •  DiazePAM (VALIUM PO), Take  by mouth., Disp: , Rfl:   •  diclofenac (VOLTAREN) 75 MG EC tablet, Take 1 tablet by mouth 2 (Two) Times a Day As Needed (moderate pain)., Disp: 20 tablet, Rfl: 0  •  doxycycline (MONODOX) 100 MG capsule, Take 1 capsule by mouth 2 (Two) Times a Day., Disp: 20 capsule, Rfl: 0  •  fosinopril (MONOPRIL) 10 MG tablet, Take 10 mg by mouth Daily., Disp: , Rfl:   •  hydrochlorothiazide (MICROZIDE) 12.5 MG capsule, Take 12.5 mg by mouth Daily., Disp: , Rfl:   •  HYDROcodone-acetaminophen (NORCO) 7.5-325 MG per tablet, Take 1 tablet by mouth Every 6 (Six) Hours As Needed for moderate pain (4-6)., Disp: 24 tablet, Rfl: 0  •  hydrOXYzine (ATARAX) 25 MG tablet, Take 1 tablet by mouth 3 (Three) Times a Day As Needed for Itching., Disp: 90 tablet, Rfl: 5  •  Loratadine 10 MG capsule, Take  by mouth., Disp: , Rfl:   •  metoprolol succinate XL (TOPROL-XL) 25 MG 24 hr tablet, Take 25 mg by mouth 2 (two) times a day., Disp: , Rfl:   •  mupirocin (BACTROBAN) 2 % nasal ointment, 1 application into the nostril(s) as directed by provider 2 (Two) Times a Day. Affected area, not the nostrils  (as autofilled by our EMR), Disp: 22 g, Rfl: 3  •  ondansetron ODT (ZOFRAN-ODT) 4 MG disintegrating tablet, Take 1 tablet by mouth Every 6 (Six) Hours As Needed for Nausea., Disp: 15 tablet, Rfl: 0  •  penicillin v potassium (VEETID) 500 MG tablet, Take 1 tablet by mouth 4 (Four) Times a Day., Disp: 28 tablet, Rfl: 0  •  Simethicone (MAALOX ANTI-GAS PO), Take  by mouth., Disp: , Rfl:     ALLERGIES:    Allergies   Allergen Reactions   • Azithromycin          REVIEW OF SYSTEMS:    A comprehensive 14 point review of systems was performed.  Significant findings as mentioned above.  All other systems reviewed and are negative.        Physical Exam   Vital Signs:   Vitals:    08/03/21 1202   BP: 159/85   Pulse: 79   Resp: 18   Temp: 98.4 °F (36.9 °C)   SpO2: 94%    Patient's Body mass index is 25.09 kg/m².    General: Well developed, well nourished, white female, in no acute distress.   Head: ATNC   Eyes: PERRL, No evidence of conjunctivitis.   Nose: No nasal discharge.   Mouth: Oral mucosal membranes moist.   Neck: Neck supple. No thyromegaly. No JVD.   Lungs: Clear in all fields to A&P without rales, rhonchi or wheezing.   Heart: S1, S2. Regular rate and rhythm. No murmurs, rubs, or gallops.   Abdomen: Soft. Bowel sounds are normoactive. Nontender with palpation.   Extremities: No clubbing, cyanosis or edema.   Integumentary: Multiple 2 cm or less, erythematous lesions, some with crusting noted on scalp and multiple areas on the body.  Neurologic: Grossly non-focal exam.    Pain Score:  Pain Score    08/03/21 1202   PainSc: 10-Worst pain ever   PainLoc: Comment: all over pain          RECENT LABS:  Lab Results   Component Value Date    WBC 9.35 07/09/2021    HGB 15.6 07/09/2021    HCT 50.2 (H) 07/09/2021    MCV 90.5 07/09/2021    RDW 16.3 (H) 07/09/2021     07/09/2021    NEUTRORELPCT 77.6 (H) 07/09/2021    LYMPHORELPCT 12.4 (L) 07/09/2021    MONORELPCT 7.3 07/09/2021    EOSRELPCT 1.9 07/09/2021    BASORELPCT 0.5  "07/09/2021    NEUTROABS 7.25 (H) 07/09/2021    LYMPHSABS 1.16 07/09/2021       Lab Results   Component Value Date     07/09/2021    K 4.2 07/09/2021    CO2 28.0 07/09/2021     07/09/2021    BUN 11 07/09/2021    CREATININE 1.01 (H) 07/09/2021    EGFRIFNONA 54 (L) 07/09/2021    GLUCOSE 97 07/09/2021    CALCIUM 9.5 07/09/2021    ALKPHOS 116 07/09/2021    AST 14 07/09/2021    ALT 10 07/09/2021    BILITOT 0.4 07/09/2021    ALBUMIN 3.65 07/09/2021    PROTEINTOT 7.6 07/09/2021    MG 2.0 11/17/2020         ASSESSMENT & PLAN:  Sobeida Light is a very pleasant 71 y.o. female with    1. Squamous cell lung cancer  - Please see Dr. Zavala's most recent note from 06/25/2021 for full details regarding oncology history. She is being seen today for an acute visit. She is not actively receiving chemotherapy/immunotherapy. She will follow up with Dr. Zavala as previously planned.    2. Ulcerative dermatitis  - Pruritic, erythematous lesions on scalp and multiple areas of body. Records were obtained from Dr. Valero. Dr. Valero believed these lesions were likely a result of anxiety/stress. However, patient disagrees. She denies any anxiety/stress. However, she has been sleeping in her car because she is afraid that her home is infested with \"MITES\". She also reports that she takes Valium at night. When inquiring as to why she takes Valium she reports it helps her \"relax\".  - She reports Bactroban ointment and Atarax that Dr. Zavala prescribed has been helping. Advised to continue this. RX provided for Doxycycline 100 mg BID x 14 days. Referral placed for dermatology with Dr. Naik.   - Advised patient to follow up with PCP for further management and evaluation of anxiety. Offered referral to [facility] but she declines.         A total of 25 minutes were spent coordinating this patient’s care in clinic today; more than 50% of this time was face-to-face with the patient, reviewing her interim medical history and " counseling on the current treatment and followup plan. All questions were answered to her satisfaction.          Electronically Signed by: JIGAR Gomes APRN August 3, 2021 14:12 EDT       CC:   No ref. provider found  Meagan Pérez APRN

## 2021-08-06 NOTE — TELEPHONE ENCOUNTER
Caller: PT    Relationship: SELF    Best call back number: [phone] OK TO LEAVE A VM MSG     Medication needed:   armodafinil (Nuvigil) 150 MG tablet [[id number]] (Order [id number])        When do you need the refill by: ASAP    What additional details did the patient provide when requesting the medication: PT WENT TO PHARMACY TO PICK THIS UP AND IT NEEDS A PRIOR AUTHORIZATION    Does the patient have less than a 3 day supply:  [x] Yes  [] No    What is the patient's preferred pharmacy:        Pharmacy    [facility] [place], [place] - [address] [place] [address] - [phone]  - [phone] [address] [address]   Phone:  [phone]  Fax:  [phone]

## 2021-08-10 NOTE — TELEPHONE ENCOUNTER
Caller: JUN    Relationship: PATIENT    Best call back number: [phone]     What was the call regarding: JUN CALLED FOR AN UPDATE, STATING HER PHARMACY DIDN'T RECEIVE THE PRIOR AUTH APPROVAL FOR THIS MEDICATION. I STARTED TO LET HER KNOW THAT INSURANCE WOULD NOT APPROVED OF THE MEDICATION. CALL DISCONNECTED. I CALLED BACK BUT NO ANSWER AND NO VM SET UP.

## 2021-08-10 NOTE — TELEPHONE ENCOUNTER
PLZ SEND MEDICATION TO [facility] IN [place].   [address]  [facility]   NUMBER: [phone]   [phone]    I ALREADY SPOKE TO DAVON AND PT IS GOING TO USE THE [employer] JOB TO GET THE MEDICATION WITHOUT INS.

## 2021-08-13 NOTE — TELEPHONE ENCOUNTER
PATIENT CALLED STATING THAT SHE HAD MISSED A CALL FROM OUR OFFICE AT FIRST.  SHE STATED NO VM BUT THEN STATED THAT SHE WAS NOT SURE IF IT WAS FROM US.      INFORMED PATIENT THAT ONCE HER CT HAS BEEN RESCHEDULED WE CAN ADJUST HER FU

## 2021-08-13 NOTE — TELEPHONE ENCOUNTER
Caller: JUN    Relationship: PATIENT    Best call back number: [phone]     What is the best time to reach you: ANYTIME    What was the call regarding: SHE WAS NOT ABLE TO HAVE HER PET SCAN TODAY BECAUSE THE MACHINE WAS DOWN. SHE'S WAITING ON A CALL WITH THE NEW PET SCAN DATE & TIME. I ADVISED SHE CALL BACK ONCE SHE HEARS FROM THEM SO WE CAN SCHEDULE HER FOLLOW UP ACCORDINGLY.    Do you require a callback: IF NECESSARY

## 2021-08-23 NOTE — PROGRESS NOTES
"  Name:  Sobeida Light  :  1950  Date:  2021     REFERRING PHYSICIAN    PRIMARY CARE PROVIDER  Provider, No Known    REASON FOR FOLLOWUP  1. Malignant neoplasm of lung, unspecified laterality, unspecified part of lung (CMS/HCC)      CHIEF COMPLAINT  Fatigue and scalp lesions.    Dear Ms. Pérez,    HISTORY OF PRESENT ILLNESS:   I saw Ms. Light in follow up today in our medical oncology clinic. As you are aware, she is a pleasant, 71 y.o., white female with a history of hypertension, COPD and lung cancer who was initially diagnosed with the latter (stage IIIA) in ~. She underwent concomitant chemotherapy and radiation between 2015 and 2016 (though another oncologist's office) for localized disease. She apparently did well until 2019, when she was found to have recurrence(s) to her right lung and both hilar areas. She was started on palliative Keytruda (again, through another oncologist's office) and reportedly did overall well with this treatment until the latter part of . In ~Sept/Oct 2020, a repeat NM PET scan purportedly showed progressive disease, and a switch to palliative gemcitabine was offered. Following this, she did not followup with her prior oncologist (or any other oncologist) until 2021, when she was referred to our clinic to reestablish routine, oncologic management and followup.    INTERIM HISTORY:  Ms. Light returns to clinic today for followup accompanied by her sister. Despite not specifically following with anyone regarding her lung cancer between 2020 and 2021, she has been doing overall fairly well from a malignancy standpoint. The itchy lesions/blisters on her scalp (generally underneath her hair) continue to wax and wane. She has been putting mupirocin ointment on them, which she still thinks helps some (but only transiently). She has also been repeatedly picking or \"mashing\" at them, despite repeated suggestions that she try to stop. " Thinking back, she does believe that they first became a problem shortly after she started Keytruda. She does have a history of psoriasis; but she reiterates that her psoriasis has recently been a minimal issue, and these lesions (the ones on her scalp) are completely different from her psoriasis lesions. She again has no new or other specific complaints.    Past Medical History:   Diagnosis Date   • Ankle fracture    • Anxiety    • Anxiety    • Arthritis    • COPD (chronic obstructive pulmonary disease) (CMS/[facility])    • Depression    • Hypertension    • Lung cancer (CMS/[facility])    • Wrist fracture        Past Surgical History:   Procedure Laterality Date   • ANKLE SURGERY     • CHOLECYSTECTOMY     • TONSILLECTOMY         Social History     Socioeconomic History   • Marital status:      Spouse name: Not on file   • Number of children: Not on file   • Years of education: Not on file   • Highest education level: Not on file   Tobacco Use   • Smoking status: Current Every Day Smoker     Packs/day: 0.50     Types: Cigarettes   • Smokeless tobacco: Never Used   Vaping Use   • Vaping Use: Never used   Substance and Sexual Activity   • Alcohol use: No   • Drug use: No   • Sexual activity: Defer       History reviewed. No pertinent family history.    Allergies   Allergen Reactions   • Azithromycin        Current Outpatient Medications   Medication Sig Dispense Refill   • albuterol (ACCUNEB) 1.25 MG/3ML nebulizer solution Take 1 ampule by nebulization Every 6 (Six) Hours As Needed for Wheezing.     • albuterol (PROAIR RESPICLICK) 108 (90 Base) MCG/ACT inhaler Inhale Every 4 (Four) Hours As Needed for Wheezing.     • armodafinil (Nuvigil) 150 MG tablet Take 1 tablet by mouth Daily. 30 tablet 3   • DiazePAM (VALIUM PO) Take  by mouth.     • diclofenac (VOLTAREN) 75 MG EC tablet Take 1 tablet by mouth 2 (Two) Times a Day As Needed (moderate pain). 20 tablet 0   • fosinopril (MONOPRIL) 10 MG tablet Take 10 mg by mouth Daily.      • hydrochlorothiazide (MICROZIDE) 12.5 MG capsule Take 12.5 mg by mouth Daily.     • HYDROcodone-acetaminophen (NORCO) 7.5-325 MG per tablet Take 1 tablet by mouth Every 6 (Six) Hours As Needed for moderate pain (4-6). 24 tablet 0   • hydrOXYzine (ATARAX) 25 MG tablet Take 1 tablet by mouth 3 (Three) Times a Day As Needed for Itching. 90 tablet 5   • IVERMECTIN PO Take 3 mg by mouth Daily.     • Loratadine 10 MG capsule Take  by mouth.     • metoprolol succinate XL (TOPROL-XL) 25 MG 24 hr tablet Take 25 mg by mouth 2 (two) times a day.     • mupirocin (BACTROBAN) 2 % nasal ointment 1 application into the nostril(s) as directed by provider 2 (Two) Times a Day. Affected area, not the nostrils (as autofilled by our EMR) 22 g 3   • ondansetron ODT (ZOFRAN-ODT) 4 MG disintegrating tablet Take 1 tablet by mouth Every 6 (Six) Hours As Needed for Nausea. 15 tablet 0   • penicillin v potassium (VEETID) 500 MG tablet Take 1 tablet by mouth 4 (Four) Times a Day. 28 tablet 0   • Simethicone (MAALOX ANTI-GAS PO) Take  by mouth.     • mupirocin (BACTROBAN) 2 % ointment Apply  topically to the appropriate area as directed 3 (Three) Times a Day. 1 g 5     No current facility-administered medications for this visit.     REVIEW OF SYSTEMS  CONSTITUTIONAL:  No fever, chills or night sweats.  EYES:  No blurry vision, diplopia or other vision changes.  ENT:  No hearing loss, nosebleeds or sore throat.  CARDIOVASCULAR:  No palpitations, arrhythmia, syncopal episodes or edema.  PULMONARY:  No hemoptysis, wheezing or chronic cough. Mild, chronic, shortness of breath, particularly with exertion, recently still stable.  GASTROINTESTINAL:  No nausea or vomiting. No constipation or diarrhea. No abdominal pain.  GENITOURINARY:  No hematuria, kidney stones or frequent urination.  MUSCULOSKELETAL:  No joint or back pains.  INTEGUMENTARY: As per the HPI above.  ENDOCRINE:  No excessive thirst or hot flashes.  HEMATOLOGIC:  No history of free  "bleeding, spontaneous bleeding or clotting.  IMMUNOLOGIC:  No allergies or frequent infections.  NEUROLOGIC: No numbness, tingling, seizures or weakness.  PSYCHIATRIC:  No anxiety or depression.    PHYSICAL EXAMINATION  /86   Pulse 76   Temp 97.3 °F (36.3 °C) (Temporal)   Resp 18   Ht 154.9 cm (61\")   Wt 60.1 kg (132 lb 6.4 oz)   SpO2 100%   BMI 25.02 kg/m²     Pain Score:  Pain Score    08/23/21 1102   PainSc:   8   PainLoc: Head  Comment: dermatology issue       PHQ-Score Total:  PHQ-9 Total Score:      GENERAL:  A well-developed, well-nourished, thin, elderly, white female in no acute distress.  HEENT:  Pupils equally round and reactive to light. Extraocular muscles intact.  CARDIOVASCULAR:  Regular rate and rhythm.  No murmurs, gallops or rubs.  LUNGS:  Clear to auscultation bilaterally.  ABDOMEN:  Soft, nontender, nondistended with positive bowel sounds.  EXTREMITIES:  No clubbing, cyanosis or edema bilaterally.  SKIN:  No petechiae. Scalp: Several ~1 cm or less, pustular, blisters in various states of evolution. Left-sided powerport still in place.  NEURO:  Cranial nerves grossly intact. No focal deficits.  PSYCH:  Alert and oriented x3.    LABORATORY  Lab Results   Component Value Date    WBC 10.77 08/23/2021    HGB 14.4 08/23/2021    HCT 47.8 (H) 08/23/2021    MCV 91.6 08/23/2021     08/23/2021    NEUTROABS 8.16 (H) 08/23/2021       Lab Results   Component Value Date     08/23/2021    K 5.0 08/23/2021     08/23/2021    CO2 31.2 (H) 08/23/2021    BUN 11 08/23/2021    CREATININE 0.91 08/23/2021    GLUCOSE 105 (H) 08/23/2021    CALCIUM 9.2 08/23/2021    AST 25 08/23/2021    ALT 19 08/23/2021    ALKPHOS 186 (H) 08/23/2021    BILITOT 0.5 08/23/2021    PROTEINTOT 7.2 08/23/2021    ALBUMIN 3.28 (L) 08/23/2021     CBC (08/23/2021): WBCs: 10.77; HgB: 14.4; Hct: 47.8; platelets: 238  CBC (07/06/2021): WBCs: 10.32; HgB: 15.2; Hct: 48.1; platelets: 257  CBC (05/18/2021): WBCs: 11.62; HgB: " 15.5; Hct: 51.2; platelets: 294    IMAGING  NM PET scan (05/22/2019, per prior oncologist's clinic note):  Impression: [Hypermetabolic mass in the anterior right portion of the right upper lobe maximum SUV of 5.4 corresponding to soft tissue mass 1.4 x 1.2 cm. Also multiple foci of abnormal hypermetabolism seen within mediastinum the focus within the prevascular region demonstrates a mSUV of 10.2. Finding corresponds to 1.3 cm lymph node. Another focus with SUV of 7.9 and left suprahilar region corresponding to lymph node 1.7 cm also a small hypermetabolic focus in the left infrahilar region with SUV of 3.8.    CT chest without contrast (07/06/2021):  Impression: Spiculated right upper lobe mass measuring 3.8 cm. Adjacent, subpleural nodule measuring 7 mm. Soft tissue density is noted in the region of right hilum that may represent component of soft tissue mass. A smaller, right upper lobe spiculated pulmonary nodule measures 6 mm.    CT abdomen and pelvis without contrast (07/06/2021):  Impression: No acute findings in the abdomen or pelvis.    NM PET with fusion CT, skull base to mid-thigh (08/20/2021):  Impression:  1) Small right pleural effusion.  2) A right upper lobe subpleural mass measuring 2 cm shows hypermetabolism with mSUV 10.8.  3) A 1.2 cm right upper lobe pulmonary nodule shows no appreciable PET hypermetabolism at this time.  4) Right hilar region and subcarinal region lymph node low-grade hypermetabolism with mSUV 2.5.    PATHOLOGY  Lung, FNA, right upper lobe (06/25/2019): Malignant.    Lung, biopsy, right upper lobe nodule (06/25/2019):  Moderately differentiated squamous cell carcinoma.     IMPRESSION AND PLAN  Ms. Light is a 71 y.o., white female with:  1. Squamous cell lung carcinoma: Initially diagnosed in 2015 with stage IIIA disease and status post concomitant chemo/XRT. Did well until ~late 2019, when she was diagnosed with a (now metastatic) recurrence. Again apparently did well for the  next ~year on palliative pembrolizumab (Keytruda), but this was stopped by early Fall 2020 when a repeat NM PET scan was consistent with disease progression. She has not had any specific, cancer-directed treatment since then (she was offered a change in palliative treatment to gemcitabine by her previous oncologist; however, she ultimately elected to not follow up with him or any other oncologist until she initially presented to our clinic in late June 2021). Today, over ten (10) months later, other than issues #2 and #3 (see below), she continues to feel overall well and has no specific complaints. New, baseline CT scans performed on 07/06/2021 (and summarized above) showed a few nodules/masses in her right lung only, which, at that time, with no prior scan available for comparison, were difficult to further characterize (benign lesions vs. prior treatment related changes vs. stable malignancy vs. progressive malignancy). Particularly since she had been (and still is) doing clinically overall very well for nearly one full year now (without any specific treatment); and her breathing still remains at baseline, close monitoring was the initial agreed upon management plan. Today, ~six weeks after the CT scans, a repeat NM PET scan (performed on 08/20/2021 and summarized above) currently shows, at worst, minimal areas of possibly active disease (although the exact etiology of all these areas remains difficult to determine. Given its mSUV (10.8), the subpleural mass in the right upper lobe appears to be the only current area of clinical significance. Restarting palliative, systemic treatment, particularly chemotherapy, would likely cause more harm than good (at least at this time). We will therefore refer her to [facility] radiation oncology for further evaluation and consideration of possible SRS to the RUL mass. We will see her back in our clinic in three months (~mid-November) with a repeat chest CT.  2. Scalp lesions:  Reportedly first developed shortly after she started palliative pembrolizumab (Keytruda) in late 2020 but overall unchanged despite not receiving any cycles of Keytruda for well over ~ten (10) months now. The pathology from a few of these lesions was consistent with ulcerative dermatitis ('s nodule) only. Follow up with dermatology as previously planned.  3. Fatigue: Likely multifactorial. A recent TSH and T4 were unremarkable, and a Rx for Nuvigil 150 mg PO daily prn was previously provided. Continue to monitor.  The patient and her sister were in agreement with these plans.    It is a pleasure to participate in Mr. Light's care. Please do not hesitate to call with any questions or concerns that you may have.    A total of 30 minutes were spent coordinating this patient’s care in clinic today; more than 50% of this time was face-to-face with the patient and her sister, reviewing her interim medical history, discussing the results of the recent repeat PET scan and counseling on the current evaluation, treatment and followup plan. All questions were answered to their satisfaction.    FOLLOW UP  With dermatology, as previously planned. Referral made to radiation oncology for consideration of SRS to isolated, ~2 cm, hypermetabolic right upper lobe lung nodule. Return to our clinic in 3 months (~mid-November 2021) with a CBC, CMP and CT of the chest with contrast.            This document was electronically signed by BELLA Zavala MD August 23, 2021 11:54 EDT      CC: JIGAR Mckenna MD Melissa L. F. Knuckles, MD

## 2021-09-02 NOTE — TELEPHONE ENCOUNTER
Caller: KAVON    Relationship: [facility]    Best call back number:  [phone]    What form or medical record are you requesting: PET SCAN RESULTS     Who is requesting this form or medical record from you: MD. MIRZA MCNAIR    How would you like to receive the form or medical records (pick-up, mail, fax): FAX  If fax, what is the fax number: [phone]    Timeframe paperwork needed: NEXT AVAILABLE    Additional notes:

## 2021-09-12 NOTE — TELEPHONE ENCOUNTER
Says she has not been taking her water pills as prescribed. Advised to take diuretics as prescribed.    Reason for Disposition  • [1] MILD swelling of both ankles (i.e., pedal edema) AND [2] new onset or worsening    Additional Information  • Negative: Severe difficulty breathing (e.g., struggling for each breath, speaks in single words)  • Negative: Looks like a broken bone or dislocated joint (e.g., crooked or deformed)  • Negative: Sounds like a life-threatening emergency to the triager  • Negative: Chest pain  • Negative: Followed a leg injury  • Negative: [1] Small area of swelling AND [2] followed an insect bite to the area  • Negative: Swelling of one ankle joint  • Negative: Swelling of knee is main symptom  • Negative: Pregnant  • Negative: Postpartum (from 0 to 6 weeks after delivery)  • Negative: Difficulty breathing at rest  • Negative: Entire foot is cool or blue in comparison to other side  • Negative: [1] Can't walk or can barely walk AND [2] new onset  • Negative: [1] Difficulty breathing with exertion (e.g., walking) AND [2] new onset or worsening  • Negative: [1] Red area or streak AND [2] fever  • Negative: [1] Swelling is painful to touch AND [2] fever  • Negative: [1] Cast on leg or ankle AND [2] now increased pain  • Negative: Patient sounds very sick or weak to the triager  • Negative: SEVERE leg swelling (e.g., swelling extends above knee, entire leg is swollen, weeping fluid)  • Negative: [1] Red area or streak [2] large (> 2 in. or 5 cm)  • Negative: [1] Thigh or calf pain AND [2] only 1 side AND [3] present > 1 hour  • Negative: [1] Thigh, calf, or ankle swelling AND [2] only 1 side  • Negative: [1] Thigh, calf, or ankle swelling AND [2] bilateral AND [3] 1 side is more swollen  • Negative: [1] MODERATE leg swelling (e.g., swelling extends up to knees) AND [2] new onset or worsening  • Negative: Swelling of face, arm or hands  (Exception: slight puffiness of fingers occurring during hot  "weather)  • Negative: Looks like a boil, infected sore, deep ulcer or other infected rash (spreading redness, pus)    Answer Assessment - Initial Assessment Questions  1. ONSET: \"When did the swelling start?\" (e.g., minutes, hours, days)  week  2. LOCATION: \"What part of the leg is swollen?\"  \"Are both legs swollen or just one leg?\"  both  3. SEVERITY: \"How bad is the swelling?\" (e.g., localized; mild, moderate, severe)   - Localized - small area of swelling localized to one leg   - MILD pedal edema - swelling limited to foot and ankle, pitting edema < 1/4 inch (6 mm) deep, rest and elevation eliminate most or all swelling   - MODERATE edema - swelling of lower leg to knee, pitting edema > 1/4 inch (6 mm) deep, rest and elevation only partially reduce swelling   - SEVERE edema - swelling extends above knee, facial or hand swelling present     mild  4. REDNESS: \"Does the swelling look red or infected?\"   denies  5. PAIN: \"Is the swelling painful to touch?\" If so, ask: \"How painful is it?\"   (Scale 1-10; mild, moderate or severe)      *No Answer*  6. FEVER: \"Do you have a fever?\" If so, ask: \"What is it, how was it measured, and when did it start?\"   no  7. CAUSE: \"What do you think is causing the leg swelling?\"  Does not know  8. MEDICAL HISTORY: \"Do you have a history of heart failure, kidney disease, liver failure, or cancer?\"  Kidney problems  9. RECURRENT SYMPTOM: \"Have you had leg swelling before?\" If so, ask: \"When was the last time?\" \"What happened that time?\"  Never had swelling before  10. OTHER SYMPTOMS: \"Do you have any other symptoms?\" (e.g., chest pain, difficulty breathing)    Denies  She has lung cancer  11. PREGNANCY: \"Is there any chance you are pregnant?\" \"When was your last menstrual period?\"  na    Protocols used: LEG SWELLING AND EDEMA-ADULT-AH      " ,DirectAddress_Unknown

## 2021-09-13 NOTE — TELEPHONE ENCOUNTER
Caller: Sobeida Light    Relationship: Self    Best call back number:[phone]     What is the best time to reach you: ASAP    Who are you requesting to speak with (clinical staff, provider,  specific staff member): NURSE    Do you know the name of the person who called:     What was the call regarding: RADIATION, PAIN MEDICINE    Do you require a callback: YES

## 2021-09-14 NOTE — TELEPHONE ENCOUNTER
Spoke with pt's sister related to patient called yesterday about pcp increasing pain medications.  has no problem with pain med being increased if that's what pcp is ok with.

## 2021-10-29 NOTE — TELEPHONE ENCOUNTER
Caller: PATIENT    Requested Prescriptions:   Requested Prescriptions     Pending Prescriptions Disp Refills   • armodafinil (Nuvigil) 150 MG tablet 30 tablet 3     Sig: Take 1 tablet by mouth Daily.        Pharmacy where request should be sent: [address] - [phone] [facility] [phone] [facility]    Additional details provided by patient: PER PATIENT, HER LAST REFILL COST HER $79.  WE NEED TO CONTACT HER PHARMACY IN ORDER TO VERIFY RX FOR HER FOR INSURANCE TO PAY?    Best call back number: [phone] (SIBLING RAO)     Does the patient have less than a 3 day supply:  [x] Yes  [] No    Yanique KIM Rep   10/29/21 09:24 EDT

## 2021-10-29 NOTE — TELEPHONE ENCOUNTER
Caller: Candace Fischer    Relationship: Emergency Contact    Best call back number: [phone]    What is the best time to reach you: ANYTIME    Who are you requesting to speak with (clinical staff, provider,  specific staff member): NURSE    What was the call regarding: PATIENT WOULD LIKE TO KNOW IF SHE IS SAFE TO GET COVID VACCINE, AS SHE ALSO HAS AN AUTO IMMUNE DISEASE AND IS CONCERNED.      Do you require a callback: YES.  PHONE NUMBER IS FOR KIMBERLICANDACE,  AS PATIENT'S PHONE DOES NOT RING.  LEAVE VM IF NO ANSWER.

## 2021-11-05 NOTE — ED PROVIDER NOTES
Subjective   71-year-old female with recurrent metastatic lung cancer presents in the company of her sister with a chief complaint of a 5-day history of severe fatigue, generalized weakness, not getting out of bed, not eating, not drinking. Patient admits to shortness of breath and cough, but she states that this is always the case. She cannot tell if it is different than usual. Patient admits to having generalized aches and pains over the past week or so. No fever, chills, diaphoresis, palpitations, hemoptysis, abdominal pain, vomiting, hematemesis, hematochezia or melena, syncope or near syncope, focal numbness or weakness, other symptoms or other complaints.          Review of Systems   All other systems reviewed and are negative.      Past Medical History:   Diagnosis Date   • Ankle fracture    • Anxiety    • Anxiety    • Arthritis    • COPD (chronic obstructive pulmonary disease) (CMS/HCC)    • Depression    • Hypertension    • Lung cancer (CMS/HCC)    • Wrist fracture        Allergies   Allergen Reactions   • Azithromycin        Past Surgical History:   Procedure Laterality Date   • ANKLE SURGERY     • CHOLECYSTECTOMY     • TONSILLECTOMY         No family history on file.    Social History     Socioeconomic History   • Marital status:    Tobacco Use   • Smoking status: Current Every Day Smoker     Packs/day: 0.50     Types: Cigarettes   • Smokeless tobacco: Never Used   Vaping Use   • Vaping Use: Never used   Substance and Sexual Activity   • Alcohol use: No   • Drug use: No   • Sexual activity: Defer           Objective   Physical Exam  Vitals and nursing note reviewed.   Constitutional:       Appearance: She is well-developed. She is ill-appearing. She is not diaphoretic.      Comments: Chronically ill-appearing female, appears acutely ill as well.   HENT:      Head: Normocephalic and atraumatic.   Eyes:      General: No scleral icterus.     Pupils: Pupils are equal, round, and reactive to light.    Neck:      Trachea: No tracheal deviation.   Cardiovascular:      Rate and Rhythm: Tachycardia present. Rhythm irregular.      Comments: Irregularly irregular, tachycardic.  Apparent atrial fibrillation with a rate of 109 at the time of my initial exam.  Pulmonary:      Comments: Respirations mildly to moderately labored.  Diminished breath sounds especially in the right base, diffuse rhonchi.  No wheezes are heard.  Chest:      Chest wall: No tenderness.   Abdominal:      General: Bowel sounds are normal.      Palpations: Abdomen is soft.      Tenderness: There is no abdominal tenderness. There is no guarding or rebound.   Musculoskeletal:         General: No tenderness. Normal range of motion.      Cervical back: Normal range of motion and neck supple. No rigidity or tenderness.   Skin:     General: Skin is warm and dry.      Capillary Refill: Capillary refill takes less than 2 seconds.      Coloration: Skin is not pale.   Neurological:      General: No focal deficit present.      Mental Status: She is alert and oriented to person, place, and time.      GCS: GCS eye subscore is 4. GCS verbal subscore is 5. GCS motor subscore is 6.   Psychiatric:         Behavior: Behavior normal.         Procedures  CT Abdomen Pelvis With Contrast   Final Result      1.  Large right-sided pleural effusion with extensive atelectasis in the right lung base developing from prior study of 7/6/2021.      2.  Small amount of free fluid surrounding the lateral aspect of the liver. No focal hepatic lesions.      3.  2.6 cm infrarenal aortic aneurysm.      4.  Interval development of anasarca within the subcutaneous fat of the abdomen and pelvis.      5.  Cardiomegaly with trace amount of pericardial fluid.               Signer Name: Rodrigue Yoo MD    Signed: 11/5/2021 8:23 PM    Workstation Name: [id number]     [facility]      CT Chest Pulmonary Embolism   Final Result   1. There is narrowing of the  patient's right main pulmonary artery and upper lobar branches. This in part appears to be secondary to tumor burden. It is also uncertain if there may be some potential chronic thrombus attached to the artery wall narrowing   the lumen. This was discussed with Dr. Mcpherson at the time of dictation.   2. Findings are consistent with progressive tumor burden throughout the right lung with increased right pleural effusion which now appears moderate to large in size, potentially malignant.   3. There appears to be a broken line in the patient's right jugular vein      Signer Name: Susana Villagomez MD    Signed: 11/5/2021 8:31 PM    Workstation Name: [email or web address] [id number]     [facility]      CT Head Without Contrast   Final Result   No acute intracranial abnormality.               Signer Name: Susana Villagomez MD    Signed: 11/5/2021 8:14 PM    Workstation Name: [email or web address] [id number]     [facility]      XR Chest 1 View   Final Result   Findings appear consistent with increased right pleural effusion which appears to be partially layered. Spiculated lesions throughout the patient's right lung consistent with malignancy are partially visualized.      Signer Name: Susana Villagomez MD    Signed: 11/5/2021 5:42 PM    Workstation Name: [place] [id number]     [facility]        Results for orders placed or performed during the hospital encounter of 11/05/21   COVID-19 and FLU A/B PCR - Swab, Nasopharynx    Specimen: Nasopharynx; Swab   Result Value Ref Range    COVID19 Not Detected Not Detected - Ref. Range    Influenza A PCR Not Detected Not Detected    Influenza B PCR Not Detected Not Detected   Blood Gas, Arterial With Co-Ox    Specimen: Arterial Blood   Result Value Ref Range    Site Right Brachial     Hernando's Test N/A     pH, Arterial 7.362 7.350 - 7.450 pH units    pCO2, Arterial 60.7 (H) 35.0 - 45.0 mm Hg    pO2, Arterial 62.9 (L) 83.0 - 108.0 mm Hg    HCO3, Arterial 34.5 (H) 20.0 - 26.0  mmol/L    Base Excess, Arterial 6.9 (H) 0.0 - 2.0 mmol/L    O2 Saturation, Arterial 90.4 (L) 94.0 - 99.0 %    Hemoglobin, Blood Gas 14.3 13.5 - 17.5 g/dL    Hematocrit, Blood Gas 44.0 38.0 - 51.0 %    Oxyhemoglobin 88.3 (L) 94 - 99 %    Methemoglobin 0.00 0.00 - 3.00 %    Carboxyhemoglobin 2.4 0 - 5 %    A-a Gradiant 117.1 0.0 - 300.0 mmHg    CO2 Content 36.3 (H) 22 - 33 mmol/L    Temperature 0.0 C    Barometric Pressure for Blood Gas 733 mmHg    Modality Nasal Cannula     FIO2 36 %    Flow Rate 4.0 lpm    Ventilator Mode NA     Note      Collected by [id number]     pH, Temp Corrected      pCO2, Temperature Corrected      pO2, Temperature Corrected     Comprehensive Metabolic Panel    Specimen: Arm, Right; Blood   Result Value Ref Range    Glucose 110 (H) 65 - 99 mg/dL    BUN 46 (H) 8 - 23 mg/dL    Creatinine 1.11 (H) 0.57 - 1.00 mg/dL    Sodium 141 136 - 145 mmol/L    Potassium 4.9 3.5 - 5.2 mmol/L    Chloride 100 98 - 107 mmol/L    CO2 31.7 (H) 22.0 - 29.0 mmol/L    Calcium 8.5 (L) 8.6 - 10.5 mg/dL    Total Protein 7.2 6.0 - 8.5 g/dL    Albumin 3.39 (L) 3.50 - 5.20 g/dL    ALT (SGPT) 686 (H) 1 - 33 U/L    AST (SGOT) 260 (H) 1 - 32 U/L    Alkaline Phosphatase 161 (H) 39 - 117 U/L    Total Bilirubin 1.5 (H) 0.0 - 1.2 mg/dL    eGFR Non African Amer 49 (L) >60 mL/min/1.73    Globulin 3.8 gm/dL    A/G Ratio 0.9 g/dL    BUN/Creatinine Ratio 41.4 (H) 7.0 - 25.0    Anion Gap 9.3 5.0 - 15.0 mmol/L   BNP    Specimen: Arm, Right; Blood   Result Value Ref Range    proBNP 4,091.0 (H) 0.0 - 900.0 pg/mL   Troponin    Specimen: Arm, Right; Blood   Result Value Ref Range    Troponin T 0.147 (C) 0.000 - 0.030 ng/mL   Troponin    Specimen: Hand, Right; Blood   Result Value Ref Range    Troponin T 0.126 (C) 0.000 - 0.030 ng/mL   Lactic Acid, Plasma    Specimen: Arm, Right; Blood   Result Value Ref Range    Lactate 2.0 0.5 - 2.0 mmol/L   C-reactive Protein    Specimen: Arm, Right; Blood   Result Value Ref Range    C-Reactive Protein 10.34  (H) 0.00 - 0.50 mg/dL   T4, Free    Specimen: Arm, Right; Blood   Result Value Ref Range    Free T4 0.71 (L) 0.93 - 1.70 ng/dL   Phosphorus    Specimen: Arm, Right; Blood   Result Value Ref Range    Phosphorus 2.8 2.5 - 4.5 mg/dL   TSH    Specimen: Arm, Right; Blood   Result Value Ref Range    TSH 7.420 (H) 0.270 - 4.200 uIU/mL   Magnesium    Specimen: Arm, Right; Blood   Result Value Ref Range    Magnesium 2.3 1.6 - 2.4 mg/dL   CK    Specimen: Arm, Right; Blood   Result Value Ref Range    Creatine Kinase 55 20 - 180 U/L   Ammonia    Specimen: Arm, Right; Blood   Result Value Ref Range    Ammonia 21 11 - 51 umol/L   CBC Auto Differential    Specimen: Blood   Result Value Ref Range    WBC 20.12 (H) 3.40 - 10.80 10*3/mm3    RBC 5.46 (H) 3.77 - 5.28 10*6/mm3    Hemoglobin 15.1 12.0 - 15.9 g/dL    Hematocrit 51.1 (H) 34.0 - 46.6 %    MCV 93.6 79.0 - 97.0 fL    MCH 27.7 26.6 - 33.0 pg    MCHC 29.5 (L) 31.5 - 35.7 g/dL    RDW 16.9 (H) 12.3 - 15.4 %    RDW-SD 57.1 (H) 37.0 - 54.0 fl    MPV 10.2 6.0 - 12.0 fL    Platelets 172 140 - 450 10*3/mm3    Neutrophil % 86.9 (H) 42.7 - 76.0 %    Lymphocyte % 2.8 (L) 19.6 - 45.3 %    Monocyte % 9.7 5.0 - 12.0 %    Eosinophil % 0.0 (L) 0.3 - 6.2 %    Basophil % 0.1 0.0 - 1.5 %    Immature Grans % 0.5 0.0 - 0.5 %    Neutrophils, Absolute 17.48 (H) 1.70 - 7.00 10*3/mm3    Lymphocytes, Absolute 0.56 (L) 0.70 - 3.10 10*3/mm3    Monocytes, Absolute 1.95 (H) 0.10 - 0.90 10*3/mm3    Eosinophils, Absolute 0.01 0.00 - 0.40 10*3/mm3    Basophils, Absolute 0.02 0.00 - 0.20 10*3/mm3    Immature Grans, Absolute 0.10 (H) 0.00 - 0.05 10*3/mm3    nRBC 0.3 (H) 0.0 - 0.2 /100 WBC   Blood Gas, Arterial With Co-Ox    Specimen: Arterial Blood   Result Value Ref Range    Site Right Radial     Hernanod's Test Positive     pH, Arterial 7.234 (L) 7.350 - 7.450 pH units    pCO2, Arterial 80.8 (C) 35.0 - 45.0 mm Hg    pO2, Arterial 49.2 (C) 83.0 - 108.0 mm Hg    HCO3, Arterial 34.1 (H) 20.0 - 26.0 mmol/L    Base  Excess, Arterial 3.8 (H) 0.0 - 2.0 mmol/L    O2 Saturation, Arterial 76.2 (L) 94.0 - 99.0 %    Hemoglobin, Blood Gas 14.1 13.5 - 17.5 g/dL    Hematocrit, Blood Gas 43.1 38.0 - 51.0 %    Oxyhemoglobin 74.8 (L) 94 - 99 %    Methemoglobin <-0.10 (L) 0.00 - 3.00 %    Carboxyhemoglobin 2.2 0 - 5 %    A-a Gradiant 108.8 0.0 - 300.0 mmHg    CO2 Content 36.6 (H) 22 - 33 mmol/L    Temperature 0.0 C    Barometric Pressure for Blood Gas 734 mmHg    Modality Nasal Cannula     FIO2 36 %    Flow Rate 4.0 lpm    Ventilator Mode NA     Note Read back and acknowledge     Notified Who DR RATNA GARCIA RN     Notified By [id number]     Notified Time 11/05/2021 22:06     Collected by [id number]     pH, Temp Corrected      pCO2, Temperature Corrected      pO2, Temperature Corrected     ECG 12 Lead   Result Value Ref Range    QT Interval 260 ms    QTC Interval 419 ms   Green Top (Gel)   Result Value Ref Range    Extra Tube Hold for add-ons.    Lavender Top   Result Value Ref Range    Extra Tube hold for add-on    Gold Top - SST   Result Value Ref Range    Extra Tube Hold for add-ons.    Light Blue Top   Result Value Ref Range    Extra Tube hold for add-on    Green Top (Gel)   Result Value Ref Range    Extra Tube Hold for add-ons.    Lavender Top   Result Value Ref Range    Extra Tube hold for add-on                 ED Course  ED Course as of 11/06/21 0126   Fri Nov 05, 2021   1744 Patient has now developed atrial flutter.  EKG performed at 1735 shows atrial flutter with 2-1 AV conduction, ventricular rate 156.  QRS duration 98, QTc 419 ms.  Nonspecific ST-T changes.  No evidence for STEMI.  I have ordered Cardizem. [CM]   1958 Sent the patient's EKG to Dr. Parr for his review, we discussed the case, he advised electrical cardioversion and start Eliquis 5 mg twice daily.  After IV Versed, synchronized cardioversion x1 at 120 J successfully converted her to sinus rhythm, tolerated well.  Currently sinus rhythm at 87 on the monitor.  Repeat EKG is  pending. [CM]   2007 EKG at 2004 shows sinus rhythm, rate 87.  MI interval 116, QRS duration 70, QTc 413 ms.  ST-T abnormality, possible inferolateral ischemia.  No evidence for STEMI. [CM]   2032 Still awaiting CT chest report.  Patient is tachycardic once again, uncertain rhythm on the monitor, rate 138.  Ordering repeat EKG. [CM]   2038 Now back to sinus rhythm at 82 on the monitor. [CM]   2116 I discussed the case with Dr. Chou.  She is admitting patient to the hospitalist service. [CM]   2128 I discussed the situation with the patient and her sister, regarding end-of-life care and life support therapy.  Patient voices that she does not want CPR, intubation or mechanical ventilation.  Her sister agrees with this DO NOT RESUSCITATE status. [CM]   2212 Patient's respiratory status worsening, starting her on BiPAP. [CM]   2247 Patient refuses to wear the BiPAP, keeps back taking it off, does not want to wear.  The sister does not want us to try to force it on her, she wants to put her back on nasal cannula and focus more on comfort measures.  Dr. Chou notified of this. [CM]      ED Course User Index  [CM] Yohannes Franco MD                                           MDM  Number of Diagnoses or Management Options  Critical Care  Total time providing critical care: 30-74 minutes (45)      Final diagnoses:   Recurrent non-small cell lung cancer (HCC)   Acute on chronic respiratory failure with hypoxia and hypercapnia (HCC)   Atrial fibrillation and flutter (HCC)       ED Disposition  ED Disposition     ED Disposition Condition Comment    Decision to Admit  Level of Care: Progressive Care [20]   Diagnosis: Recurrent non-small cell lung cancer (HCC) [[id number]]   Admitting Physician: MCKENNA CHOU [[id number]]   Certification: I Certify That Inpatient Hospital Services Are Medically Necessary For Greater Than 2 Midnights            Please note that portions of this note were completed with a voice recognition  program.            Yohannes Franco MD  11/06/21 0126

## 2021-11-05 NOTE — PROGRESS NOTES
Pharmacy was consulted for vancomycin and cefepime dosing for PNA/sepsis.  Based on pt parameters will initiate vancomycin at 1g iv q24hrs to target trough levels of 15-20 mg/L and AUC of 400-600.  Pharmacy will continue to follow and obtain trough level once steady state is achieved.  Will dose cefepime at 2g iv q12hrs per extended infusion protocol. Thank you.

## 2021-11-06 PROBLEM — C34.90 RECURRENT NON-SMALL CELL LUNG CANCER (HCC): Status: ACTIVE | Noted: 2021-01-01

## 2021-11-06 NOTE — H&P
Hospitalist History and Physical    Patient Identification:  Name: Sobeida Light  Age/Sex: 71 y.o. female  :  1950  MRN: [id number]        Admit Date: 2021   Primary Care Physician: Provider, No Known    Chief Complaint   Patient presents with   • Weakness - Generalized       History of Present Illness  Patient is a 71 y.o. female presents with the following: severe generalized weakness    The patient is a 71-year-old female with past medical history significant for squamous cell lung cancer initially diagnosed in  with recent recurrence with metastatic disease, hypertension and COPD who presents to the emergency department complaining of severe fatigue and weakness in addition to shortness of air and cough.    Please note that during my exam 1, no family is present at bedside.  The patient is arousable but is unable to provide any history of presenting illness.  When she speaks it is as if she mumbles and I am unable to understand what she is trying to say.    According to the emergency department physician, the patient symptoms have been present for approximately 5 days.  While in the emergency department, the patient developed atrial flutter with 2-1 conduction and a ventricular rate of 156.  Patient was started on Cardizem infusion and this was maximized while in the emergency department, however, the patient remained tachycardic.  Patient also received intravenous digoxin.  The patient was cardioverted and given a dose of Eliquis per cardiology recommendations.    The patient also developed acute on chronic hypoxic and hypercapnic respiratory failure while in the emergency department and was placed on BiPAP, however, the patient refused to wear BiPAP and only wanted to wear nasal cannula.    According to the patient's most recent oncology note from 2021, the patient was initially diagnosed with stage IIIa lung cancer in approximately  and underwent concomitant chemotherapy and  radiation for localized disease.  The patient did well until the fall 2019 when she was found to have recurrence of the right lung in both hilar areas.  She was then started on palliative Keytruda and did well until the fall of 2020.  In the fall 2020, a repeat PET scan reportedly showed progressive disease for which the patient was offered palliative gemcitabine however she did not follow-up with her prior oncologist until June 2021 when she was referred to our oncology clinic.  She reported dermatitis that was thought to be related to Keytruda specifically complaining of lesions on her scalp.  During this visit, she was referred to radiation oncology for consideration of possible radiation to the right upper lobe mass.     Present during visit: FARIHA Smith    Past History:  Past Medical History:   Diagnosis Date   • Ankle fracture    • Anxiety    • Anxiety    • Arthritis    • COPD (chronic obstructive pulmonary disease) (HCC)    • Depression    • Hypertension    • Lung cancer (HCC)    • Wrist fracture      Past Surgical History:   Procedure Laterality Date   • ANKLE SURGERY     • CHOLECYSTECTOMY     • TONSILLECTOMY       History reviewed. No pertinent family history.  Social History     Tobacco Use   • Smoking status: Current Every Day Smoker     Packs/day: 0.50     Types: Cigarettes   • Smokeless tobacco: Never Used   Vaping Use   • Vaping Use: Never used   Substance Use Topics   • Alcohol use: No   • Drug use: No     Medications Prior to Admission   Medication Sig Dispense Refill Last Dose   • albuterol (ACCUNEB) 1.25 MG/3ML nebulizer solution Take 1 ampule by nebulization Every 6 (Six) Hours As Needed for Wheezing.      • albuterol (PROAIR RESPICLICK) 108 (90 Base) MCG/ACT inhaler Inhale Every 4 (Four) Hours As Needed for Wheezing.      • armodafinil (Nuvigil) 150 MG tablet Take 1 tablet by mouth Daily. 30 tablet 3    • DiazePAM (VALIUM PO) Take  by mouth.      • diclofenac (VOLTAREN) 75 MG EC tablet Take 1  tablet by mouth 2 (Two) Times a Day As Needed (moderate pain). 20 tablet 0    • fosinopril (MONOPRIL) 10 MG tablet Take 10 mg by mouth Daily.      • hydrochlorothiazide (MICROZIDE) 12.5 MG capsule Take 12.5 mg by mouth Daily.      • HYDROcodone-acetaminophen (NORCO) 7.5-325 MG per tablet Take 1 tablet by mouth Every 6 (Six) Hours As Needed for moderate pain (4-6). 24 tablet 0    • hydrOXYzine (ATARAX) 25 MG tablet Take 1 tablet by mouth 3 (Three) Times a Day As Needed for Itching. 90 tablet 5    • IVERMECTIN PO Take 3 mg by mouth Daily.      • Loratadine 10 MG capsule Take  by mouth.      • metoprolol succinate XL (TOPROL-XL) 25 MG 24 hr tablet Take 25 mg by mouth 2 (two) times a day.      • mupirocin (BACTROBAN) 2 % nasal ointment 1 application into the nostril(s) as directed by provider 2 (Two) Times a Day. Affected area, not the nostrils (as autofilled by our EMR) 22 g 3    • mupirocin (BACTROBAN) 2 % ointment Apply  topically to the appropriate area as directed 3 (Three) Times a Day. 1 g 5    • ondansetron ODT (ZOFRAN-ODT) 4 MG disintegrating tablet Take 1 tablet by mouth Every 6 (Six) Hours As Needed for Nausea. 15 tablet 0    • penicillin v potassium (VEETID) 500 MG tablet Take 1 tablet by mouth 4 (Four) Times a Day. 28 tablet 0    • Simethicone (MAALOX ANTI-GAS PO) Take  by mouth.        Allergies: Azithromycin    Review of Systems:  Review of Systems   Unable to perform ROS: Acuity of condition      Vital Signs  Temp:  [97.8 °F (36.6 °C)-98.6 °F (37 °C)] 97.8 °F (36.6 °C)  Heart Rate:  [] 142  Resp:  [18-24] 18  BP: (101-153)/(57-84) 118/60  Body mass index is 20.12 kg/m².    Physical Exam:  Physical Exam  Constitutional:       General: She is in acute distress.      Appearance: She is well-developed. She is ill-appearing.      Interventions: Nasal cannula in place.   HENT:      Head: Normocephalic and atraumatic.      Mouth/Throat:      Mouth: Mucous membranes are dry.   Eyes:      Conjunctiva/sclera:  Conjunctivae normal.   Neck:      Trachea: No tracheal deviation.   Cardiovascular:      Rate and Rhythm: Regular rhythm. Tachycardia present.      Pulses:           Posterior tibial pulses are 2+ on the right side and 2+ on the left side.      Heart sounds: No murmur heard.  No friction rub. No gallop.    Pulmonary:      Effort: No respiratory distress.      Breath sounds: Wheezing and rales present.      Comments: Coarse bilateral breath sounds  Chest:      Comments: Port-A-Cath in place, left upper chest  Abdominal:      General: Bowel sounds are decreased. There is no distension.      Palpations: Abdomen is soft.      Tenderness: There is no abdominal tenderness. There is no guarding.   Musculoskeletal:      Right lower leg: No edema.      Left lower leg: No edema.   Skin:     General: Skin is warm and dry.      Findings: No erythema or rash.   Neurological:      Mental Status: She is lethargic and disoriented.      Comments: Unable to follow commands         Results Review:    Results from last 7 days   Lab Units 11/05/21  2201   PH, ARTERIAL pH units 7.234*   PO2 ART mm Hg 49.2*   PCO2, ARTERIAL mm Hg 80.8*   HCO3 ART mmol/L 34.1*       Results from last 7 days   Lab Units 11/05/21  1640   WBC 10*3/mm3 20.12*   HEMOGLOBIN g/dL 15.1   HEMATOCRIT % 51.1*   PLATELETS 10*3/mm3 172     Results from last 7 days   Lab Units 11/05/21  1720   SODIUM mmol/L 141   POTASSIUM mmol/L 4.9   CHLORIDE mmol/L 100   CO2 mmol/L 31.7*   BUN mg/dL 46*   CREATININE mg/dL 1.11*   CALCIUM mg/dL 8.5*   GLUCOSE mg/dL 110*     Results from last 7 days   Lab Units 11/05/21  1720   BILIRUBIN mg/dL 1.5*   ALK PHOS U/L 161*   AST (SGOT) U/L 260*   ALT (SGPT) U/L 686*     Results from last 7 days   Lab Units 11/05/21  1720   CRP mg/dL 10.34*     Results from last 7 days   Lab Units 11/05/21  1720   MAGNESIUM mg/dL 2.3     Results from last 7 days   Lab Units 11/05/21 2005 11/05/21  1720   CK TOTAL U/L  --  55   TROPONIN T ng/mL 0.126* 0.147*      Imaging:    I have personally reviewed the EKG; atrial fibrillation with RVR    CT chest images reviewed and per my view patient with large RUL mass and large pleural effusion    Imaging Results (Most Recent)     Procedure Component Value Units Date/Time    CT Chest Pulmonary Embolism [[id number]] Collected: 11/05/21 2031     Updated: 11/05/21 2033    Narrative:      CT CHEST PULMONARY EMBOLISM W CONTRAST    INDICATION:   Shortness of breath, lung cancer    TECHNIQUE:   CT angiogram of the chest with IV contrast. 3-D reconstructions were obtained and reviewed.   Radiation dose reduction techniques included automated exposure control or exposure modulation based on body size. Count of known CT and cardiac nuc med studies  performed in previous 12 months: 0.     COMPARISON:   CT of the chest from 7/6/2021    FINDINGS:   There is narrowing of the patient's right main pulmonary artery and upper lobar branches. This in part appears to be secondary to tumor burden. It is also uncertain if there may be some potential chronic thrombus attached to the artery wall narrowing the  lumen. Findings are consistent with progressive tumor burden throughout the right lung with increased right pleural effusion which now appears moderate to large in size, potentially malignant. There appears to be a broken line in the patient's right  jugular vein. No acute osseous abnormality.      Impression:      1. There is narrowing of the patient's right main pulmonary artery and upper lobar branches. This in part appears to be secondary to tumor burden. It is also uncertain if there may be some potential chronic thrombus attached to the artery wall narrowing  the lumen. This was discussed with Dr. Mcpherson at the time of dictation.  2. Findings are consistent with progressive tumor burden throughout the right lung with increased right pleural effusion which now appears moderate to large in size, potentially malignant.  3. There appears to  be a broken line in the patient's right jugular vein    Signer Name: Susana Villagomez MD   Signed: 11/5/2021 8:31 PM   Workstation Name: [id number]    [facility]    CT Abdomen Pelvis With Contrast [[id number]] Collected: 11/05/21 2023     Updated: 11/05/21 2026    Narrative:      CT Abdomen Pelvis W    INDICATION:   History of lung carcinoma. Onset of weakness, shortness of breath and abdominal pain today    TECHNIQUE:   CT of the abdomen and pelvis with contrast. Coronal and sagittal reconstructions were obtained.  Radiation dose reduction techniques included automated exposure control or exposure modulation based on body size. Radiation audit for number of CT and  nuclear cardiology exams performed in the last year: 5.      COMPARISON:   July 6, 2021    FINDINGS:  There is a large right-sided pleural effusion with extensive atelectasis in the right lung base. This has developed in the interval from 7/6/2021.    The heart size is enlarged. Trace amount of pericardial fluid. Small hiatal hernia.    Abdomen: The liver shows normal enhancement. No focal hepatic lesions. The gallbladder is surgically absent. Small amount of free fluid surrounds the liver.    The kidneys demonstrate an extrarenal pelvis on the left side. No obvious renal masses. No adrenal abnormalities.    The aorta and demonstrates extensive atherosclerotic change with aneurysmal dilatation. The infrarenal aorta measures approximately 2.6 cm in greatest diameter. No retroperitoneal adenopathy.    No pancreatic masses. The spleen appears unremarkable.    Pelvis: The bladder is moderately distended. Subcutaneous soft tissues show evidence of anasarca.    The bowel pattern is nonobstructive. No free air. Regional osseous structures show no acute or aggressive bone lesions.      Impression:        1.  Large right-sided pleural effusion with extensive atelectasis in the right lung base developing from prior study of 7/6/2021.    2.  Small  amount of free fluid surrounding the lateral aspect of the liver. No focal hepatic lesions.    3.  2.6 cm infrarenal aortic aneurysm.    4.  Interval development of anasarca within the subcutaneous fat of the abdomen and pelvis.    5.  Cardiomegaly with trace amount of pericardial fluid.          Signer Name: Rodrigue Yoo MD   Signed: 11/5/2021 8:23 PM   Workstation Name: [id number]    [facility]    CT Head Without Contrast [[id number]] Collected: 11/05/21 2014     Updated: 11/05/21 2016    Narrative:      CT Head WO: 11/5/2021 8:41 PM    HISTORY:   Weakness, shortness of breath, lung cancer    TECHNIQUE:   Axial unenhanced head CT. Radiation dose reduction techniques included automated exposure control or exposure modulation based on body size. Radiation audit for number of CT and nuclear cardiology exams performed in the last year: 0.      COMPARISON:   CT of the head from 11/17/2020    FINDINGS:   No intracranial hemorrhage, mass, or infarct. No hydrocephalus or extra-axial fluid collection. The skull base, calvarium, and extracranial soft tissues are normal.      Impression:      No acute intracranial abnormality.          Signer Name: Susana Villagomez MD   Signed: 11/5/2021 8:14 PM   Workstation Name: [id number]    [facility]    XR Chest 1 View [[id number]] Collected: 11/05/21 1742     Updated: 11/05/21 1744    Narrative:      CR Chest 1 Vw    INDICATION:   Generalized weakness    COMPARISON:    Chest x-ray from 1/31/2017    FINDINGS:  Portable AP view(s) of the chest. Left-sided Port-A-Cath is seen with tip in the SVC. Increased hazy opacity throughout the patient's right lung field suggests increased pleural effusion. Spiculated lesions at the patient's right lung apex are partially  visualized. No pneumothorax.      Impression:      Findings appear consistent with increased right pleural effusion which appears to be partially layered. Spiculated lesions throughout  the patient's right lung consistent with malignancy are partially visualized.    Signer Name: Susana Villagomez MD   Signed: 11/5/2021 5:42 PM   Workstation Name: [id number]    [facility]          Assessment/Plan     -Severe weakness and fatigue, multifactorial and related to below  -Sepsis, present on admission and due to possible right sided postobstructive pneumonia  -Acute hypoxic and hypercapnic respiratory failure  -Progressive right-sided pleural effusion, now moderate to large in size that is likely malignant  -Recurrent, metastatic squamous cell carcinoma of the lung, initially diagnosed in 2015  -Possible chronic thrombus attached to the right main pulmonary artery  -New onset atrial fibrillation/flutter with RVR  -Question of broken line or other object in patient's right jugular vein of unclear etiology  -Elevated transaminases; question due to passive congestion from large right-sided pleural effusion  -2.6 cm infrarenal aortic aneurysm  -Cardiomegaly with trace amount of pericardial effusion  -COPD  -Tobacco use  -Hypertension  -Anxiety/depression  -History of reported dermatitis thought to be related to Keytruda    Patient has been admitted to progressive care unit.  Continue with the Cardizem infusion for now.  We will repeat a dose of IV digoxin for now.  Consider cardiology consult based on patient's sisters wishes although according to the emergency department physician, the patient and her sister were leaning more towards comfort measures.  During my exam, the patient was unable to provide any history or relate to me what her wishes are at this time. Patient would likely benefit from a therapeutic thoracentesis.    The patient received a one-time dose of Eliquis in the emergency department but will hold on further anticoagulation at this time until further decisions regarding patient's treatment plan can be discussed with the patient's Sister Candace.  I did attempt to contact  Candace this morning (via my office extension, [phone]) at [phone] without any answer.     Consult palliative care when available.  Continue with oxygen and titrate for saturations greater than 90%.  I have started the patient on low-dose as needed morphine.  I started the patient on scheduled Atrovent.  She will receive a dose of IV Lasix this morning.  Patient adamantly refused BiPAP while in the emergency department and was noted to have an elevated CO2 level.      For now, continue with pharmacy to dose vancomycin, cefepime and Flagyl for possible underlying pneumonia.  Obtain an MRSA nasal swab and discontinued if negative.  Follow-up final blood culture results that were obtained in the emergency department.    Continue to monitor on telemetry for now.    Further management pending patient's sister's wishes regarding ongoing care.    DVT prophylaxis: SCDs    Estimated Length of Stay: > 2 MNs    Disposition Unclear at this time pending discussion with patient's sister Candace    I discussed the patients findings and my recommendations with nursing staff          Mari Gonzalez DO  11/06/21  03:20 EDT

## 2021-11-06 NOTE — THERAPY EVALUATION
Acute Care - Speech Language Pathology   Swallow Initial Evaluation [facility]   CLINICAL DYSPHAGIA ASSESSMENT     Patient Name: Sobeida Light  : 1950  MRN: [id number]  Today's Date: 2021             Admit Date: 2021    Visit Dx:     ICD-10-CM ICD-9-CM   1. Recurrent non-small cell lung cancer (HCC)  C34.90 162.9   2. Acute on chronic respiratory failure with hypoxia and hypercapnia (HCC)  J96.21 518.84    J96.22 786.09     799.02   3. Atrial fibrillation and flutter (HCC)  I48.91 427.31    I48.92 427.32     Patient Active Problem List   Diagnosis   • Lung cancer (HCC)   • Left wrist pain   • COPD (chronic obstructive pulmonary disease) (HCC)   • Hypertension   • Recurrent non-small cell lung cancer (HCC)     Past Medical History:   Diagnosis Date   • Ankle fracture    • Anxiety    • Anxiety    • Arthritis    • COPD (chronic obstructive pulmonary disease) (HCC)    • Depression    • Hypertension    • Lung cancer (HCC)    • Wrist fracture      Past Surgical History:   Procedure Laterality Date   • ANKLE SURGERY     • CHOLECYSTECTOMY     • TONSILLECTOMY       Sobeida Light  is seen at bedside this pm on PCU to assess safety/efficacy of swallowing fnx, determine safest/least restrictive diet tolerance. She is NPO pending this assessment. Family members x2 present briefly during this assessment.     Ms. Light is noted on BiPAP this pm, however, MD clears and requests pt to transition to to NC at this time for prompt dysphagia assessment as Ms. Lgiht is requesting po intake and NPO since admit.     Ms. Light is admitted to [facility] w/ sepsis 2/2 possible R sided postobstructive PNA related to known lung CA. Ms. Light denies any overt s/s aspiration or dysphagia w/ po intake.     Social History     Socioeconomic History   • Marital status:    Tobacco Use   • Smoking status: Current Every Day Smoker     Packs/day: 0.50     Types: Cigarettes   • Smokeless tobacco: Never Used   Vaping Use   • Vaping  Use: Never used   Substance and Sexual Activity   • Alcohol use: No   • Drug use: No   • Sexual activity: Defer      CR Chest 1 Vw     INDICATION:   Generalized weakness     COMPARISON:    Chest x-ray from 1/31/2017     FINDINGS:  Portable AP view(s) of the chest. Left-sided Port-A-Cath is seen with tip in the SVC. Increased hazy opacity throughout the patient's right lung field suggests increased pleural effusion. Spiculated lesions at the patient's right lung apex are partially  visualized. No pneumothorax.     IMPRESSION:  Findings appear consistent with increased right pleural effusion which appears to be partially layered. Spiculated lesions throughout the patient's right lung consistent with malignancy are partially visualized.     Signer Name: Susana Villagomez MD   Signed: 11/5/2021 5:42 PM   Workstation Name: [id number]    [facility]    Diet Orders (active) (From admission, onward)     Start     Ordered    11/06/21 0132  NPO Diet  Diet Effective Now         11/06/21 0131              She is observed on 15L O2 bubble HF w/o complications.     Pt is positioned upright and centered in bed to accept multiple po presentations of ice chips puree and 6 ounces of thin liquids via spoon, cup and straw. She does not self feed. She declines solid cracker 2/2 fatigue effects. She is appears somewhat uncomfortable across this assessment, frequently repositioning in bed 2/2 back/buttock discomfort. She is generally weak and fatigued.     Facial/oral structures are symmetrical upon observation. Lingual protrusion reveals no deviation. Oral mucosa are moist, pink, and clean. Secretions are clear, thin, and well controlled. OROM/ELADIA is wfl to imitate oral postures. Gag is not assessed. Volitional cough is intact w/ adequate intensity, clear in quality, non-productive. Voice is adequate in intensity, clear in quality w/ intelligible speech. She is oriented to person, follows directives and participates in  conversational exchanges.     Upon po presentations, adequate bolus anticipation and acceptance w/ good labial seal for bolus clearance via spoon bowl, cup rim stability and suction via straw. Bolus formation, manipulation and control are generally weak. A-p transit is mildly delayed w/o oral residue. No overt s/s aspiration evidenced before the swallow.     Pharyngeal swallow is timely w/ adequate hyolaryngeal elevation per palpation. No overt s/s aspiration evidenced across this assessment. No silent aspiration suspected as pt is w/o changes in vocal quality, respirations or secretions post po presentations. Pt denies odynophagia.    Impression: Per this assessment, Ms. Light presents w/ generalized weakness and fatigue per current medical/respiratory status resulting in a mild-moderate oral weakness, wfl pharyngeal swallow w/o s/s aspiration. No s/s indicative of silent aspiration. Least restrictive modified po diet is felt to be puree consistency, thin liquids at this time.     SLP Recommendation and Plan  1. Puree consistency, thin liquids.    2. Meds whole in puree/thins.   3. Upright and centered for all po intake.  4. SUZY precautions.  5. Oral care protocol.  6. Assist w/ po PRN.   7. NPO when on BiPAP.   SLP to f/u for diet safety/assessment for possible upgrade.     D/w pt and pt's family results and recommendations w/ verbal agreement.    D/w RN and MD results and recommendations w/ verbal agreement. MD provides v/o to initiate recommended po diet.     Thank you for allowing me to participate in the care of your patient-  Isatu Robles M.A., CCC-SLP      EDUCATION  The patient has been educated in the following areas:   Dysphagia (Swallowing Impairment) Modified Diet Instruction.     Time Calculation:    Time Calculation- SLP     Row Name 11/06/21 1317             Time Calculation- SLP    SLP Received On 11/06/21  -      SLP - Next Appointment 11/08/21  -            User Key  (r) = Recorded By, (t)  = Taken By, (c) = Cosigned By    Initials Name Provider Type    Isatu Sorensen MA,CCC-SLP Speech and Language Pathologist              Patient was not wearing a face mask during this therapy encounter. Therapist used appropriate personal protective equipment including mask, eye protection and gloves.  Mask used was standard procedure mask. Appropriate PPE was worn during the entire therapy session. The patient coughed during this evaluation. Therapist was within 6 feet for 15 minutes or more during the evaluation. Hand hygiene was completed before and after therapy session. Patient is not in enhanced droplet precautions.       Isatu Robles MA,CCC-SLP  11/6/2021

## 2021-11-06 NOTE — ED NOTES
Patient noted remove self from bi-pap multitiple times stating she does not want it on , patient and family educated by myself and  regarding risk and benefits, verbalized understanding. Patient removed from bi-pap at this time and placed on 4lpm nasal cannula per Dr.Marcum SIMMONS.     Maci Lowery, RN  11/05/21 5439

## 2021-11-06 NOTE — PROGRESS NOTES
[facility] HOSPITALIST PROGRESS NOTE     Patient Identification:  Name:  Sobeida Light  Age:  71 y.o.  Sex:  female  :  1950  MRN:  [id number]  Visit Number:  [id number]  ROOM: [address]     Primary Care Provider:  Provider, No Known    Length of stay in inpatient status:  0    Subjective     Chief Compliant:    Chief Complaint   Patient presents with   • Weakness - Generalized       History of Presenting Illness: Patient seen and evaluated after being admitted earlier this morning by overnight hospitalist for acute hypoxic and hypercapnic respiratory failure secondary to sepsis with right-sided postobstructive pneumonia and progressive worsening right-sided pleural effusion with new onset atrial fibrillation/flutter in the setting of 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  Patient initially at time of examination today very lethargic and somnolent however upon repeat evaluation improving and agreeable to BiPAP therapy and tolerating well.  Discussed patient's condition and situation with sister at bedside and they wish to remain DNR/DNI for now but holding off on palliative/comfort measures.    Objective     Current Hospital Meds:atropine sulfate, , ,   cefepime, 2 g, Intravenous, Q12H  influenza vaccine, 0.5 mL, Intramuscular, Once  ipratropium, 0.5 mg, Nebulization, 4x Daily - RT  sodium chloride, 10 mL, Intravenous, Q12H  vancomycin, 1,000 mg, Intravenous, Q24H    dilTIAZem, 5-15 mg/hr, Last Rate: Stopped (21 1005)  heparin (porcine), 12 Units/kg/hr, Last Rate: 12 Units/kg/hr (21 1407)  Pharmacy Consult - Pharmacy to dose,   Pharmacy Consult - Pharmacy to dose,         Current Antimicrobial Therapy:  Anti-Infectives (From admission, onward)    Ordered     Dose/Rate Route Frequency Start Stop    21 1811  vancomycin 1 g/250 mL 0.9% NS (vial-mate)        Ordering Provider: Mari Gonzalez, DO    1,000 mg  over 60 Minutes Intravenous Every 24 Hours  11/06/21 1800 11/12/21 1759    11/05/21 1811  cefepime 2 gm IVPB in 100 ml NS (VTB)        Ordering Provider: Mari Gonzalez DO    2 g  over 4 Hours Intravenous Every 12 Hours 11/06/21 0600 11/13/21 0559    11/05/21 1811  cefepime 2 gm IVPB in 100 ml NS (VTB)        Ordering Provider: Yohannes Franco MD    2 g  over 30 Minutes Intravenous Once 11/05/21 1813 11/05/21 1905    11/05/21 1753  vancomycin 1 g/250 mL 0.9% NS (vial-mate)        Ordering Provider: Yohannes Franco MD    20 mg/kg × 46.7 kg  over 60 Minutes Intravenous Once 11/05/21 1800 11/05/21 2005        Current Diuretic Therapy:  Diuretics (From admission, onward)    Ordered     Dose/Rate Route Frequency Start Stop    11/06/21 0632  furosemide (LASIX) injection 20 mg        Ordering Provider: Mari Gonzalez DO    20 mg Intravenous Once 11/06/21 0730 11/06/21 0821        ----------------------------------------------------------------------------------------------------------------------  Vital Signs:  Temp:  [97.7 °F (36.5 °C)-98.6 °F (37 °C)] 98.1 °F (36.7 °C)  Heart Rate:  [] 53  Resp:  [12-27] 20  BP: ()/(49-84) 93/49  SpO2:  [50 %-100 %] 94 %  on  Flow (L/min):  [4-15] 15;   Device (Oxygen Therapy): NPPV/NIV  Body mass index is 20.14 kg/m².    Wt Readings from Last 3 Encounters:   11/06/21 46.8 kg (103 lb 1.6 oz)   08/23/21 60.1 kg (132 lb 6.4 oz)   08/03/21 60.2 kg (132 lb 12.8 oz)     Intake & Output (last 3 days)       11/03 0701 11/04 0700 11/04 0701 11/05 0700 11/05 0701 11/06 0700 11/06 0701 11/07 0700    P.O.    0    I.V. (mL/kg)   561 (12)     IV Piggyback   1350     Total Intake(mL/kg)   1911 (40.8) 0 (0)    Urine (mL/kg/hr)   0 450 (1.2)    Total Output   0 450    Net   +1911 -450                Diet Pureed; Thin  ----------------------------------------------------------------------------------------------------------------------  Physical exam:  Constitutional: Frail chronically ill-appearing elderly  adult female resting in bed with nasal cannula in place.  HENT:  Head:  Normocephalic and atraumatic.  Mouth:  Moist mucous membranes.    Eyes:  Conjunctivae and EOM are normal. No scleral icterus.    Neck:  Neck supple.  No JVD present.    Cardiovascular: Irregularly irregular with no murmur, rub, or gallop.  Pulmonary/Chest:  No respiratory distress, no wheezes, right-sided crackles present with significantly reduced air movement on the right compared to the left with intermittent scant wheezing. Port-A-Cath in place in left upper chest wall.  Abdominal:  Soft.  Bowel sounds are hypoactive.  No distension and no tenderness.   Musculoskeletal:  No edema, no tenderness, and no deformity.  No red or swollen joints anywhere.    Neurological: Somnolent but arousable and oriented to person, place, but not time.  No cranial nerve deficit.    Skin:  Skin is warm and dry with the exception of the extremities which are cool to touch. No rash or lesion noted. No pallor.   Peripheral vascular: There are no palpable pulses in the feet bilaterally in the lower extremity toes are darker and purple hue in appearance  Psychiatric: Appropriate mood and affect, pleasant.   ----------------------------------------------------------------------------------------------------------------------  Tele:    ----------------------------------------------------------------------------------------------------------------------  Results from last 7 days   Lab Units 11/06/21  1331 11/06/21  0500 11/05/21  1720 11/05/21  1640   CRP mg/dL  --   --  10.34*  --    LACTATE mmol/L  --   --  2.0  --    WBC 10*3/mm3  --  22.25*  --  20.12*   HEMOGLOBIN g/dL  --  13.7  --  15.1   HEMATOCRIT %  --  48.8*  --  51.1*   MCV fL  --  97.6*  --  93.6   MCHC g/dL  --  28.1*  --  29.5*   PLATELETS 10*3/mm3  --  159  --  172   INR  1.49*  --   --   --      Results from last 7 days   Lab Units 11/05/21  2201   PH, ARTERIAL pH units 7.234*   PO2 ART mm Hg 49.2*    PCO2, ARTERIAL mm Hg 80.8*   HCO3 ART mmol/L 34.1*     Results from last 7 days   Lab Units 11/06/21  0500 11/05/21  1720   SODIUM mmol/L 142 141   POTASSIUM mmol/L 5.1 4.9   MAGNESIUM mg/dL  --  2.3   CHLORIDE mmol/L 103 100   CO2 mmol/L 28.0 31.7*   BUN mg/dL 37* 46*   CREATININE mg/dL 0.84 1.11*   EGFR IF NONAFRICN AM mL/min/1.73 67 49*   CALCIUM mg/dL 8.1* 8.5*   PHOSPHORUS mg/dL  --  2.8   GLUCOSE mg/dL 138* 110*   ALBUMIN g/dL 3.42* 3.39*   BILIRUBIN mg/dL 1.2 1.5*   ALK PHOS U/L 156* 161*   AST (SGOT) U/L 153* 260*   ALT (SGPT) U/L 544* 686*   Estimated Creatinine Clearance: 46.7 mL/min (by C-G formula based on SCr of 0.84 mg/dL).  Ammonia   Date Value Ref Range Status   11/05/2021 21 11 - 51 umol/L Final     Results from last 7 days   Lab Units 11/05/21  2005 11/05/21  1720   CK TOTAL U/L  --  55   TROPONIN T ng/mL 0.126* 0.147*     Results from last 7 days   Lab Units 11/05/21  1720   PROBNP pg/mL 4,091.0*         No results found for: HGBA1C, POCGLU  Lab Results   Component Value Date    TSH 7.420 (H) 11/05/2021    FREET4 0.71 (L) 11/05/2021     No results found for: PREGTESTUR, PREGSERUM, HCG, HCGQUANT  Pain Management Panel     Pain Management Panel Latest Ref Rng & Units 11/17/2020 6/18/2017    AMPHETAMINES SCREEN, URINE Negative Negative Negative    BARBITURATES SCREEN Negative Negative Negative    BENZODIAZEPINE SCREEN, URINE Negative Positive(A) Positive(A)    BUPRENORPHINEUR Negative Negative Negative    COCAINE SCREEN, URINE Negative Negative Negative    METHADONE SCREEN, URINE Negative Negative Negative        Brief Urine Lab Results  (Last result in the past 365 days)      Color   Clarity   Blood   Leuk Est   Nitrite   Protein   CREAT   Urine HCG        11/17/20 1245 Yellow   Clear   Negative   Small (1+)   Negative   Negative               No results found for: BLOODCX  No results found for: URINECX  No results found for: WOUNDCX  No results found for: STOOLCX  No results found for: RESPCX  No  results found for: AFBCX  Results from last 7 days   Lab Units 11/05/21  1720   LACTATE mmol/L 2.0   CRP mg/dL 10.34*       I have personally looked at the labs and they are summarized above.  ----------------------------------------------------------------------------------------------------------------------  Detailed radiology reports for the last 24 hours:    Imaging Results (Last 24 Hours)     Procedure Component Value Units Date/Time    US Arterial Doppler Lower Extremity Bilateral [[id number]] Collected: 11/06/21 1109     Updated: 11/06/21 1111    Narrative:        US Arterial Low Ext Bilat    Clinical history: No pulse on Doppler of left lower extremity. History of lung carcinoma.    Comparison: No pertinent prior study    Findings:    The right lower extremity demonstrates elevated velocities with plaque formation throughout the major arteries of the right lower extremity. There is no flow demonstrated in the distal posterior tibial artery.    The left lower extremity demonstrates prominent plaque formation throughout the major arteries. Monophasic flow is demonstrated. Decreased flow velocities are seen throughout the left lower extremity. The posterior tibial demonstrates 8 cm/s flow.      Impression:      Impression:    1.  Elevated velocities in the right lower extremity with plaque formation throughout the major arteries. No flow is demonstrated in the distal posterior tibial artery.    2.  Diminished velocities demonstrated throughout the left lower extremity with monophasic flow seen throughout. Minimal flow demonstrated to the posterior tibial artery.    Signer Name: Rodrigue Yoo MD   Signed: 11/6/2021 11:09 AM   Workstation Name: [id number]    [facility]    US Venous Doppler Lower Extremity Bilateral (duplex) [[id number]] Collected: 11/06/21 0947     Updated: 11/06/21 0949    Narrative:      US Veins LE Duplex BILAT    HISTORY:   History of lung carcinoma. Chronic  respiratory failure with hypoxia.    TECHNIQUE:   Real-time ultrasound was performed of both lower extremities utilizing spectral and color Doppler with compression and augmentation techniques.    COMPARISON:  None available.    FINDINGS:  Right Lower Extremity:  There is no deep venous thrombus seen in the right lower extremity, including the right common femoral veins, right femoral veins and right popliteal veins.  Normal compressibility and respiratory phasicity was visualized.  No calf vein thrombus. Greater  saphenous vein is patent.    Left Lower Extremity:  There is no deep venous thrombus seen in the left lower extremity, including the left common femoral veins, left femoral veins and left popliteal veins.   Normal compressibility and respiratory phasicity was visualized.  No calf vein thrombus. Greater  saphenous vein is patent.        Impression:      No deep venous thrombus seen in either lower extremity.    Signer Name: Rodrigue Yoo MD   Signed: 11/6/2021 9:47 AM   Workstation Name: [id number]    [facility]    CT Chest Pulmonary Embolism [[id number]] Collected: 11/05/21 2031     Updated: 11/05/21 2033    Narrative:      CT CHEST PULMONARY EMBOLISM W CONTRAST    INDICATION:   Shortness of breath, lung cancer    TECHNIQUE:   CT angiogram of the chest with IV contrast. 3-D reconstructions were obtained and reviewed.   Radiation dose reduction techniques included automated exposure control or exposure modulation based on body size. Count of known CT and cardiac nuc med studies  performed in previous 12 months: 0.     COMPARISON:   CT of the chest from 7/6/2021    FINDINGS:   There is narrowing of the patient's right main pulmonary artery and upper lobar branches. This in part appears to be secondary to tumor burden. It is also uncertain if there may be some potential chronic thrombus attached to the artery wall narrowing the  lumen. Findings are consistent with progressive tumor  burden throughout the right lung with increased right pleural effusion which now appears moderate to large in size, potentially malignant. There appears to be a broken line in the patient's right  jugular vein. No acute osseous abnormality.      Impression:      1. There is narrowing of the patient's right main pulmonary artery and upper lobar branches. This in part appears to be secondary to tumor burden. It is also uncertain if there may be some potential chronic thrombus attached to the artery wall narrowing  the lumen. This was discussed with Dr. Mcpherson at the time of dictation.  2. Findings are consistent with progressive tumor burden throughout the right lung with increased right pleural effusion which now appears moderate to large in size, potentially malignant.  3. There appears to be a broken line in the patient's right jugular vein    Signer Name: Susana Villagomez MD   Signed: 11/5/2021 8:31 PM   Workstation Name: [id number]    [facility]    CT Abdomen Pelvis With Contrast [[id number]] Collected: 11/05/21 2023     Updated: 11/05/21 2026    Narrative:      CT Abdomen Pelvis W    INDICATION:   History of lung carcinoma. Onset of weakness, shortness of breath and abdominal pain today    TECHNIQUE:   CT of the abdomen and pelvis with contrast. Coronal and sagittal reconstructions were obtained.  Radiation dose reduction techniques included automated exposure control or exposure modulation based on body size. Radiation audit for number of CT and  nuclear cardiology exams performed in the last year: 5.      COMPARISON:   July 6, 2021    FINDINGS:  There is a large right-sided pleural effusion with extensive atelectasis in the right lung base. This has developed in the interval from 7/6/2021.    The heart size is enlarged. Trace amount of pericardial fluid. Small hiatal hernia.    Abdomen: The liver shows normal enhancement. No focal hepatic lesions. The gallbladder is surgically absent.  Small amount of free fluid surrounds the liver.    The kidneys demonstrate an extrarenal pelvis on the left side. No obvious renal masses. No adrenal abnormalities.    The aorta and demonstrates extensive atherosclerotic change with aneurysmal dilatation. The infrarenal aorta measures approximately 2.6 cm in greatest diameter. No retroperitoneal adenopathy.    No pancreatic masses. The spleen appears unremarkable.    Pelvis: The bladder is moderately distended. Subcutaneous soft tissues show evidence of anasarca.    The bowel pattern is nonobstructive. No free air. Regional osseous structures show no acute or aggressive bone lesions.      Impression:        1.  Large right-sided pleural effusion with extensive atelectasis in the right lung base developing from prior study of 7/6/2021.    2.  Small amount of free fluid surrounding the lateral aspect of the liver. No focal hepatic lesions.    3.  2.6 cm infrarenal aortic aneurysm.    4.  Interval development of anasarca within the subcutaneous fat of the abdomen and pelvis.    5.  Cardiomegaly with trace amount of pericardial fluid.          Signer Name: Rodrigue Yoo MD   Signed: 11/5/2021 8:23 PM   Workstation Name: [id number]    [facility]    CT Head Without Contrast [[id number]] Collected: 11/05/21 2014     Updated: 11/05/21 2016    Narrative:      CT Head WO: 11/5/2021 8:41 PM    HISTORY:   Weakness, shortness of breath, lung cancer    TECHNIQUE:   Axial unenhanced head CT. Radiation dose reduction techniques included automated exposure control or exposure modulation based on body size. Radiation audit for number of CT and nuclear cardiology exams performed in the last year: 0.      COMPARISON:   CT of the head from 11/17/2020    FINDINGS:   No intracranial hemorrhage, mass, or infarct. No hydrocephalus or extra-axial fluid collection. The skull base, calvarium, and extracranial soft tissues are normal.      Impression:      No acute  intracranial abnormality.          Signer Name: Susana Villagomez MD   Signed: 11/5/2021 8:14 PM   Workstation Name: [id number]    [facility]    XR Chest 1 View [[id number]] Collected: 11/05/21 1742     Updated: 11/05/21 1744    Narrative:      CR Chest 1 Vw    INDICATION:   Generalized weakness    COMPARISON:    Chest x-ray from 1/31/2017    FINDINGS:  Portable AP view(s) of the chest. Left-sided Port-A-Cath is seen with tip in the SVC. Increased hazy opacity throughout the patient's right lung field suggests increased pleural effusion. Spiculated lesions at the patient's right lung apex are partially  visualized. No pneumothorax.      Impression:      Findings appear consistent with increased right pleural effusion which appears to be partially layered. Spiculated lesions throughout the patient's right lung consistent with malignancy are partially visualized.    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 Chronic thrombus of right main pulmonary artery ??  Chronic obstructive pulmonary disease  Ongoing tobacco use    -Patient with significant hypercapnia at admission and initially however refusing BiPAP but now agreeable.    -Patient with moderate to large right pleural effusion that is new compared to previous imaging in July 2021, patient would potentially benefit from thoracentesis although there is significant risk for lack of reexpansion as the chronicity of her pleural effusion is unclear at this time and there appears to be a notable rind along the atelectatic inferior portion of the lung. Additionally patient unfortunately received Eliquis in the emergency department and  will need to be off anticoagulation for 48 hours prior to thoracentesis. Have discussed with patient and sister as well that effusion likely to be recurrent as it source is likely malignant in the setting of her progressive lung cancer.    -Continue broad-spectrum coverage with Vanco and cefepime at this time    -As there is questionable chronic thrombus of the right main pulmonary artery as well as on notable on CT of tumor burden likely causing some compression of the artery will place patient on heparin GTT as this will be amenable to being discontinued if patient able to undergo thoracentesis.    -Patient did receive 20 mg of Lasix this morning however given her borderline blood pressure will hold off on further diuresis at this time and monitor blood pressure throughout the day.    -Patient would benefit from palliative care consultation when available, to discuss this with patient's sister and is agreeable.    New onset atrial fibrillation/flutter    -Patient status post Cardizem infusion at max dose of 15 mg an hour as well as administration of intravenous digoxin as well as cardioversion in the emergency department with still persistent refractory atrial fibrillation/flutter.    -Patient this morning with spontaneous conversion on Cardizem and actually becoming bradycardic during conversion and Cardizem discontinued.    -Heparin GTT as above    -Has patient and family at this time still contemplating comfort measures versus continued care will consult cardiology for further evaluation and recommendations.    Peripheral arterial disease    -Patient with very poor circulation in the lower extremities with no palpable pulses concerning for significant peripheral arterial artery disease.    -Duplex venous bilaterally and duplex arterial obtained with no evidence of DVT however there is elevated velocities in the right lower extremity with plaque formation throughout the major arteries with no flow demonstrated  in the distal posterior tibial artery. On the left there is diminished velocities throughout with monophasic flow and minimal flow demonstrated to the posterior tibial artery.    -Patient would benefit from CT of the aorta and iliacs with runoff however received contrast yesterday and we will hold off for now.    -Clinically there does not appear to be any critical limb ischemia ongoing at this time.    -Cardiology consultation as above.    Ulcerative dermatitis    -Patient with multiple scattered areas with previous pathology showing lesions consistent with ulcerative dermatitis (pickers nodule)    -Supportive care, patient encouraged not to pick it wounds.    Anxiety/depression    -Waiting home medication reconciliation, will review and restart medications as appropriate.    Essential hypertension    -Patient currently borderline hypotensive, will hold all home antihypertensives    VTE Prophylaxis:   Mechanical Order History:     None      Pharmalogical Order History:      Ordered     Dose Route Frequency Stop    11/06/21 1218  heparin (porcine) 5000 UNIT/ML injection 2,800 Units         60 Units/kg IV Once 11/06/21 1406    11/06/21 1218  heparin 32305 units/250 mL (100 units/mL) in 0.45 % NaCl infusion  5.61 mL/hr         12 Units/kg/hr IV Titrated --    11/06/21 1218  heparin (porcine) 5000 UNIT/ML injection 2,800 Units         60 Units/kg IV As Needed --    11/06/21 1218  heparin (porcine) 5000 UNIT/ML injection 1,400 Units         30 Units/kg IV As Needed --    11/05/21 1959  apixaban (ELIQUIS) tablet 5 mg         5 mg PO Once 11/05/21 2024                Disposition patient with poor prognosis with large right sided pleural effusion likely malignant in origin with right upper lobe mass for which due to squamous cell carcinoma with possible tumor burden causing impingement of the pulmonary artery with possible chronic thrombus seen. Additionally patient with peripheral arterial disease further complicating  matters. Patient disposition unknown at this time and likely home versus home with hospice.    Jaycob Eldridge DO  [facility]  11/06/21  14:57 EDT

## 2021-11-06 NOTE — PLAN OF CARE
Goal Outcome Evaluation:  Plan of Care Reviewed With: patient        Progress: declining  Outcome Summary: Patient alert, confused. She converted to junctional rhythm today and is off cardizem. Pt is currently on 15L bubble high flow and was able to eat some dinner, her sister was at bedside and did get to assist patient. She did wear BIPAP some today. Unable to palpate patient's pedal pulses, was able to use doppler to find on R foot, however unable to locate pulse even with doppler on left. Patient toes are cool cyanotic, with some black discoloration. Call light within reach. Bed alarm set. Continuing to monitor at this time.

## 2021-11-06 NOTE — PLAN OF CARE
Goal Outcome Evaluation:  Plan of Care Reviewed With: patient        Progress: declining  Outcome Summary: Pt is A&O x4.  Pt admitted for SOB and afib RVR.  Pt has a hx of lung cancer that is progressing per ER staff.  Pt was on bipap in ER but would leave it alone so pt is on 7L bubble NC sating at 98%.  NPO. Bedrests lethargic and very weak.  NS in L AC @ 125.  Cardizem @15mg.  Presents with healed bug bites throughout entire body.  Lesions noted to head that [place] derm is following.  Pt toes are cyanotic in color and cold. Used doppler to find pulses in feet.  Call light in reach, Bed low, lock, alarmed. Call light in reach. Continue to monitor 7p-7a.

## 2021-11-06 NOTE — PAYOR COMM NOTE
"[facility]  MARCELO ARRIAZA  PHONE  [phone]  FAX  [phone]  NPI:  [id number]    PENDING REF#[id number]    Sobeida Hirsch (71 y.o. Female)             Date of Birth Social Security Number Address Home Phone MRN    1950  [address] [id number] [phone] [id number]    Faith Marital Status             Sikh        Admission Date Admission Type Admitting Provider Attending Provider Department, Room/Bed    11/5/21 Emergency Mari Gonzalez DO Cagle, William, DO [facility] PROGRESS CARE, P211/1P    Discharge Date Discharge Disposition Discharge Destination                         Attending Provider: Jaycob Eldridge DO    Allergies: Azithromycin    Isolation: None   Infection: None   Code Status: No CPR   Advance Care Planning Activity    Ht: 152.4 cm (60\")   Wt: 46.8 kg (103 lb 1.6 oz)    Admission Cmt: None   Principal Problem: Recurrent non-small cell lung cancer (HCC) [C34.90]                 Active Insurance as of 11/5/2021     Primary Coverage     Payor Plan Insurance Group Employer/Plan Group    ANTHEM MEDICARE REPLACEMENT ANTHEM MEDICARE ADVANTAGE [id number]     Payor Plan Address Payor Plan Phone Number Payor Plan Fax Number Effective Dates    [address] [phone]  6/1/2021 - None Entered    [facility] [id number]       Subscriber Name Subscriber Birth Date Member ID       SOBEIDA HIRSCH 1950 [id number]           Secondary Coverage     Payor Plan Insurance Group Employer/Plan Group    [id number]      Payor Plan Address Payor Plan Phone Number Payor Plan Fax Number Effective Dates    [address]   11/1/2020 - None Entered    [facility] [id number]       Subscriber Name Subscriber Birth Date Member ID       SOBEIDA HIRSCH 1950 [id number]                 Emergency Contacts      (Rel.) Home Phone Work Phone Mobile Phone    Candace Fischer (Sister) [phone] -- [phone]    "            History & Physical      Mari Gonzalez DO at 21 0319          Hospitalist History and Physical    Patient Identification:  Name: Sobeida Light  Age/Sex: 71 y.o. female  :  1950  MRN: [id number]        Admit Date: 2021   Primary Care Physician: Provider, No Known    Chief Complaint   Patient presents with   • Weakness - Generalized       History of Present Illness  Patient is a 71 y.o. female presents with the following: severe generalized weakness    The patient is a 71-year-old female with past medical history significant for squamous cell lung cancer initially diagnosed in  with recent recurrence with metastatic disease, hypertension and COPD who presents to the emergency department complaining of severe fatigue and weakness in addition to shortness of air and cough.    Please note that during my exam 1, no family is present at bedside.  The patient is arousable but is unable to provide any history of presenting illness.  When she speaks it is as if she mumbles and I am unable to understand what she is trying to say.    According to the emergency department physician, the patient symptoms have been present for approximately 5 days.  While in the emergency department, the patient developed atrial flutter with 2-1 conduction and a ventricular rate of 156.  Patient was started on Cardizem infusion and this was maximized while in the emergency department, however, the patient remained tachycardic.  Patient also received intravenous digoxin.  The patient was cardioverted and given a dose of Eliquis per cardiology recommendations.    The patient also developed acute on chronic hypoxic and hypercapnic respiratory failure while in the emergency department and was placed on BiPAP, however, the patient refused to wear BiPAP and only wanted to wear nasal cannula.    According to the patient's most recent oncology note from 2021, the patient was initially diagnosed with stage  IIIa lung cancer in approximately 2015 and underwent concomitant chemotherapy and radiation for localized disease.  The patient did well until the fall 2019 when she was found to have recurrence of the right lung in both hilar areas.  She was then started on palliative Keytruda and did well until the fall of 2020.  In the fall 2020, a repeat PET scan reportedly showed progressive disease for which the patient was offered palliative gemcitabine however she did not follow-up with her prior oncologist until June 2021 when she was referred to our oncology clinic.  She reported dermatitis that was thought to be related to Keytruda specifically complaining of lesions on her scalp.  During this visit, she was referred to radiation oncology for consideration of possible radiation to the right upper lobe mass.     Present during visit: FARIHA Smith    Past History:  Past Medical History:   Diagnosis Date   • Ankle fracture    • Anxiety    • Anxiety    • Arthritis    • COPD (chronic obstructive pulmonary disease) (HCC)    • Depression    • Hypertension    • Lung cancer (HCC)    • Wrist fracture      Past Surgical History:   Procedure Laterality Date   • ANKLE SURGERY     • CHOLECYSTECTOMY     • TONSILLECTOMY       History reviewed. No pertinent family history.  Social History     Tobacco Use   • Smoking status: Current Every Day Smoker     Packs/day: 0.50     Types: Cigarettes   • Smokeless tobacco: Never Used   Vaping Use   • Vaping Use: Never used   Substance Use Topics   • Alcohol use: No   • Drug use: No     Medications Prior to Admission   Medication Sig Dispense Refill Last Dose   • albuterol (ACCUNEB) 1.25 MG/3ML nebulizer solution Take 1 ampule by nebulization Every 6 (Six) Hours As Needed for Wheezing.      • albuterol (PROAIR RESPICLICK) 108 (90 Base) MCG/ACT inhaler Inhale Every 4 (Four) Hours As Needed for Wheezing.      • armodafinil (Nuvigil) 150 MG tablet Take 1 tablet by mouth Daily. 30 tablet 3    • DiazePAM  (VALIUM PO) Take  by mouth.      • diclofenac (VOLTAREN) 75 MG EC tablet Take 1 tablet by mouth 2 (Two) Times a Day As Needed (moderate pain). 20 tablet 0    • fosinopril (MONOPRIL) 10 MG tablet Take 10 mg by mouth Daily.      • hydrochlorothiazide (MICROZIDE) 12.5 MG capsule Take 12.5 mg by mouth Daily.      • HYDROcodone-acetaminophen (NORCO) 7.5-325 MG per tablet Take 1 tablet by mouth Every 6 (Six) Hours As Needed for moderate pain (4-6). 24 tablet 0    • hydrOXYzine (ATARAX) 25 MG tablet Take 1 tablet by mouth 3 (Three) Times a Day As Needed for Itching. 90 tablet 5    • IVERMECTIN PO Take 3 mg by mouth Daily.      • Loratadine 10 MG capsule Take  by mouth.      • metoprolol succinate XL (TOPROL-XL) 25 MG 24 hr tablet Take 25 mg by mouth 2 (two) times a day.      • mupirocin (BACTROBAN) 2 % nasal ointment 1 application into the nostril(s) as directed by provider 2 (Two) Times a Day. Affected area, not the nostrils (as autofilled by our EMR) 22 g 3    • mupirocin (BACTROBAN) 2 % ointment Apply  topically to the appropriate area as directed 3 (Three) Times a Day. 1 g 5    • ondansetron ODT (ZOFRAN-ODT) 4 MG disintegrating tablet Take 1 tablet by mouth Every 6 (Six) Hours As Needed for Nausea. 15 tablet 0    • penicillin v potassium (VEETID) 500 MG tablet Take 1 tablet by mouth 4 (Four) Times a Day. 28 tablet 0    • Simethicone (MAALOX ANTI-GAS PO) Take  by mouth.        Allergies: Azithromycin    Review of Systems:  Review of Systems   Unable to perform ROS: Acuity of condition      Vital Signs  Temp:  [97.8 °F (36.6 °C)-98.6 °F (37 °C)] 97.8 °F (36.6 °C)  Heart Rate:  [] 142  Resp:  [18-24] 18  BP: (101-153)/(57-84) 118/60  Body mass index is 20.12 kg/m².    Physical Exam:  Physical Exam  Constitutional:       General: She is in acute distress.      Appearance: She is well-developed. She is ill-appearing.      Interventions: Nasal cannula in place.   HENT:      Head: Normocephalic and atraumatic.       Mouth/Throat:      Mouth: Mucous membranes are dry.   Eyes:      Conjunctiva/sclera: Conjunctivae normal.   Neck:      Trachea: No tracheal deviation.   Cardiovascular:      Rate and Rhythm: Regular rhythm. Tachycardia present.      Pulses:           Posterior tibial pulses are 2+ on the right side and 2+ on the left side.      Heart sounds: No murmur heard.  No friction rub. No gallop.    Pulmonary:      Effort: No respiratory distress.      Breath sounds: Wheezing and rales present.      Comments: Coarse bilateral breath sounds  Chest:      Comments: Port-A-Cath in place, left upper chest  Abdominal:      General: Bowel sounds are decreased. There is no distension.      Palpations: Abdomen is soft.      Tenderness: There is no abdominal tenderness. There is no guarding.   Musculoskeletal:      Right lower leg: No edema.      Left lower leg: No edema.   Skin:     General: Skin is warm and dry.      Findings: No erythema or rash.   Neurological:      Mental Status: She is lethargic and disoriented.      Comments: Unable to follow commands         Results Review:    Results from last 7 days   Lab Units 11/05/21  2201   PH, ARTERIAL pH units 7.234*   PO2 ART mm Hg 49.2*   PCO2, ARTERIAL mm Hg 80.8*   HCO3 ART mmol/L 34.1*       Results from last 7 days   Lab Units 11/05/21  1640   WBC 10*3/mm3 20.12*   HEMOGLOBIN g/dL 15.1   HEMATOCRIT % 51.1*   PLATELETS 10*3/mm3 172     Results from last 7 days   Lab Units 11/05/21  1720   SODIUM mmol/L 141   POTASSIUM mmol/L 4.9   CHLORIDE mmol/L 100   CO2 mmol/L 31.7*   BUN mg/dL 46*   CREATININE mg/dL 1.11*   CALCIUM mg/dL 8.5*   GLUCOSE mg/dL 110*     Results from last 7 days   Lab Units 11/05/21  1720   BILIRUBIN mg/dL 1.5*   ALK PHOS U/L 161*   AST (SGOT) U/L 260*   ALT (SGPT) U/L 686*     Results from last 7 days   Lab Units 11/05/21  1720   CRP mg/dL 10.34*     Results from last 7 days   Lab Units 11/05/21  1720   MAGNESIUM mg/dL 2.3     Results from last 7 days   Lab Units  11/05/21 2005 11/05/21  1720   CK TOTAL U/L  --  55   TROPONIN T ng/mL 0.126* 0.147*     Imaging:    I have personally reviewed the EKG; atrial fibrillation with RVR    CT chest images reviewed and per my view patient with large RUL mass and large pleural effusion    Imaging Results (Most Recent)     Procedure Component Value Units Date/Time    CT Chest Pulmonary Embolism [[id number]] Collected: 11/05/21 2031     Updated: 11/05/21 2033    Narrative:      CT CHEST PULMONARY EMBOLISM W CONTRAST    INDICATION:   Shortness of breath, lung cancer    TECHNIQUE:   CT angiogram of the chest with IV contrast. 3-D reconstructions were obtained and reviewed.   Radiation dose reduction techniques included automated exposure control or exposure modulation based on body size. Count of known CT and cardiac nuc med studies  performed in previous 12 months: 0.     COMPARISON:   CT of the chest from 7/6/2021    FINDINGS:   There is narrowing of the patient's right main pulmonary artery and upper lobar branches. This in part appears to be secondary to tumor burden. It is also uncertain if there may be some potential chronic thrombus attached to the artery wall narrowing the  lumen. Findings are consistent with progressive tumor burden throughout the right lung with increased right pleural effusion which now appears moderate to large in size, potentially malignant. There appears to be a broken line in the patient's right  jugular vein. No acute osseous abnormality.      Impression:      1. There is narrowing of the patient's right main pulmonary artery and upper lobar branches. This in part appears to be secondary to tumor burden. It is also uncertain if there may be some potential chronic thrombus attached to the artery wall narrowing  the lumen. This was discussed with Dr. Mcpherson at the time of dictation.  2. Findings are consistent with progressive tumor burden throughout the right lung with increased right pleural effusion  which now appears moderate to large in size, potentially malignant.  3. There appears to be a broken line in the patient's right jugular vein    Signer Name: Susana Villagomez MD   Signed: 11/5/2021 8:31 PM   Workstation Name: [id number]    [facility]    CT Abdomen Pelvis With Contrast [[id number]] Collected: 11/05/21 2023     Updated: 11/05/21 2026    Narrative:      CT Abdomen Pelvis W    INDICATION:   History of lung carcinoma. Onset of weakness, shortness of breath and abdominal pain today    TECHNIQUE:   CT of the abdomen and pelvis with contrast. Coronal and sagittal reconstructions were obtained.  Radiation dose reduction techniques included automated exposure control or exposure modulation based on body size. Radiation audit for number of CT and  nuclear cardiology exams performed in the last year: 5.      COMPARISON:   July 6, 2021    FINDINGS:  There is a large right-sided pleural effusion with extensive atelectasis in the right lung base. This has developed in the interval from 7/6/2021.    The heart size is enlarged. Trace amount of pericardial fluid. Small hiatal hernia.    Abdomen: The liver shows normal enhancement. No focal hepatic lesions. The gallbladder is surgically absent. Small amount of free fluid surrounds the liver.    The kidneys demonstrate an extrarenal pelvis on the left side. No obvious renal masses. No adrenal abnormalities.    The aorta and demonstrates extensive atherosclerotic change with aneurysmal dilatation. The infrarenal aorta measures approximately 2.6 cm in greatest diameter. No retroperitoneal adenopathy.    No pancreatic masses. The spleen appears unremarkable.    Pelvis: The bladder is moderately distended. Subcutaneous soft tissues show evidence of anasarca.    The bowel pattern is nonobstructive. No free air. Regional osseous structures show no acute or aggressive bone lesions.      Impression:        1.  Large right-sided pleural effusion with extensive  atelectasis in the right lung base developing from prior study of 7/6/2021.    2.  Small amount of free fluid surrounding the lateral aspect of the liver. No focal hepatic lesions.    3.  2.6 cm infrarenal aortic aneurysm.    4.  Interval development of anasarca within the subcutaneous fat of the abdomen and pelvis.    5.  Cardiomegaly with trace amount of pericardial fluid.          Signer Name: Rodrigue Yoo MD   Signed: 11/5/2021 8:23 PM   Workstation Name: [id number]    [facility]    CT Head Without Contrast [[id number]] Collected: 11/05/21 2014     Updated: 11/05/21 2016    Narrative:      CT Head WO: 11/5/2021 8:41 PM    HISTORY:   Weakness, shortness of breath, lung cancer    TECHNIQUE:   Axial unenhanced head CT. Radiation dose reduction techniques included automated exposure control or exposure modulation based on body size. Radiation audit for number of CT and nuclear cardiology exams performed in the last year: 0.      COMPARISON:   CT of the head from 11/17/2020    FINDINGS:   No intracranial hemorrhage, mass, or infarct. No hydrocephalus or extra-axial fluid collection. The skull base, calvarium, and extracranial soft tissues are normal.      Impression:      No acute intracranial abnormality.          Signer Name: Susana Villagomez MD   Signed: 11/5/2021 8:14 PM   Workstation Name: [id number]    [facility]    XR Chest 1 View [[id number]] Collected: 11/05/21 1742     Updated: 11/05/21 1744    Narrative:      CR Chest 1 Vw    INDICATION:   Generalized weakness    COMPARISON:    Chest x-ray from 1/31/2017    FINDINGS:  Portable AP view(s) of the chest. Left-sided Port-A-Cath is seen with tip in the SVC. Increased hazy opacity throughout the patient's right lung field suggests increased pleural effusion. Spiculated lesions at the patient's right lung apex are partially  visualized. No pneumothorax.      Impression:      Findings appear consistent with increased  right pleural effusion which appears to be partially layered. Spiculated lesions throughout the patient's right lung consistent with malignancy are partially visualized.    Signer Name: Susana Villagomez MD   Signed: 11/5/2021 5:42 PM   Workstation Name: [id number]    [facility]          Assessment/Plan     -Severe weakness and fatigue, multifactorial and related to below  -Sepsis, present on admission and due to possible right sided postobstructive pneumonia  -Acute hypoxic and hypercapnic respiratory failure  -Progressive right-sided pleural effusion, now moderate to large in size that is likely malignant  -Recurrent, metastatic squamous cell carcinoma of the lung, initially diagnosed in 2015  -Possible chronic thrombus attached to the right main pulmonary artery  -New onset atrial fibrillation/flutter with RVR  -Question of broken line or other object in patient's right jugular vein of unclear etiology  -Elevated transaminases; question due to passive congestion from large right-sided pleural effusion  -2.6 cm infrarenal aortic aneurysm  -Cardiomegaly with trace amount of pericardial effusion  -COPD  -Tobacco use  -Hypertension  -Anxiety/depression  -History of reported dermatitis thought to be related to Keytruda    Patient has been admitted to progressive care unit.  Continue with the Cardizem infusion for now.  We will repeat a dose of IV digoxin for now.  Consider cardiology consult based on patient's sisters wishes although according to the emergency department physician, the patient and her sister were leaning more towards comfort measures.  During my exam, the patient was unable to provide any history or relate to me what her wishes are at this time. Patient would likely benefit from a therapeutic thoracentesis.    The patient received a one-time dose of Eliquis in the emergency department but will hold on further anticoagulation at this time until further decisions regarding patient's  treatment plan can be discussed with the patient's Sister Candace.  I did attempt to contact Candace this morning (via my office extension, [phone]) at [phone] without any answer.     Consult palliative care when available.  Continue with oxygen and titrate for saturations greater than 90%.  I have started the patient on low-dose as needed morphine.  I started the patient on scheduled Atrovent.  She will receive a dose of IV Lasix this morning.  Patient adamantly refused BiPAP while in the emergency department and was noted to have an elevated CO2 level.      For now, continue with pharmacy to dose vancomycin, cefepime and Flagyl for possible underlying pneumonia.  Obtain an MRSA nasal swab and discontinued if negative.  Follow-up final blood culture results that were obtained in the emergency department.    Continue to monitor on telemetry for now.    Further management pending patient's sister's wishes regarding ongoing care.    DVT prophylaxis: SCDs    Estimated Length of Stay: > 2 MNs    Disposition Unclear at this time pending discussion with patient's sister Candace    I discussed the patients findings and my recommendations with nursing staff          Mari Gonzalez DO  11/06/21  03:20 EDT    Electronically signed by Mari Gonzalez DO at 11/06/21 0657          Emergency Department Notes      Yohannes Franco MD at 11/05/21 1893          Subjective   71-year-old female with recurrent metastatic lung cancer presents in the company of her sister with a chief complaint of a 5-day history of severe fatigue, generalized weakness, not getting out of bed, not eating, not drinking. Patient admits to shortness of breath and cough, but she states that this is always the case. She cannot tell if it is different than usual. Patient admits to having generalized aches and pains over the past week or so. No fever, chills, diaphoresis, palpitations, hemoptysis, abdominal pain, vomiting, hematemesis,  hematochezia or melena, syncope or near syncope, focal numbness or weakness, other symptoms or other complaints.          Review of Systems   All other systems reviewed and are negative.      Past Medical History:   Diagnosis Date   • Ankle fracture    • Anxiety    • Anxiety    • Arthritis    • COPD (chronic obstructive pulmonary disease) (CMS/HCC)    • Depression    • Hypertension    • Lung cancer (CMS/HCC)    • Wrist fracture        Allergies   Allergen Reactions   • Azithromycin        Past Surgical History:   Procedure Laterality Date   • ANKLE SURGERY     • CHOLECYSTECTOMY     • TONSILLECTOMY         No family history on file.    Social History     Socioeconomic History   • Marital status:    Tobacco Use   • Smoking status: Current Every Day Smoker     Packs/day: 0.50     Types: Cigarettes   • Smokeless tobacco: Never Used   Vaping Use   • Vaping Use: Never used   Substance and Sexual Activity   • Alcohol use: No   • Drug use: No   • Sexual activity: Defer           Objective   Physical Exam  Vitals and nursing note reviewed.   Constitutional:       Appearance: She is well-developed. She is ill-appearing. She is not diaphoretic.      Comments: Chronically ill-appearing female, appears acutely ill as well.   HENT:      Head: Normocephalic and atraumatic.   Eyes:      General: No scleral icterus.     Pupils: Pupils are equal, round, and reactive to light.   Neck:      Trachea: No tracheal deviation.   Cardiovascular:      Rate and Rhythm: Tachycardia present. Rhythm irregular.      Comments: Irregularly irregular, tachycardic.  Apparent atrial fibrillation with a rate of 109 at the time of my initial exam.  Pulmonary:      Comments: Respirations mildly to moderately labored.  Diminished breath sounds especially in the right base, diffuse rhonchi.  No wheezes are heard.  Chest:      Chest wall: No tenderness.   Abdominal:      General: Bowel sounds are normal.      Palpations: Abdomen is soft.       Tenderness: There is no abdominal tenderness. There is no guarding or rebound.   Musculoskeletal:         General: No tenderness. Normal range of motion.      Cervical back: Normal range of motion and neck supple. No rigidity or tenderness.   Skin:     General: Skin is warm and dry.      Capillary Refill: Capillary refill takes less than 2 seconds.      Coloration: Skin is not pale.   Neurological:      General: No focal deficit present.      Mental Status: She is alert and oriented to person, place, and time.      GCS: GCS eye subscore is 4. GCS verbal subscore is 5. GCS motor subscore is 6.   Psychiatric:         Behavior: Behavior normal.         Procedures  CT Abdomen Pelvis With Contrast   Final Result      1.  Large right-sided pleural effusion with extensive atelectasis in the right lung base developing from prior study of 7/6/2021.      2.  Small amount of free fluid surrounding the lateral aspect of the liver. No focal hepatic lesions.      3.  2.6 cm infrarenal aortic aneurysm.      4.  Interval development of anasarca within the subcutaneous fat of the abdomen and pelvis.      5.  Cardiomegaly with trace amount of pericardial fluid.               Signer Name: Rodrigue Yoo MD    Signed: 11/5/2021 8:23 PM    Workstation Name: [id number]     [facility]      CT Chest Pulmonary Embolism   Final Result   1. There is narrowing of the patient's right main pulmonary artery and upper lobar branches. This in part appears to be secondary to tumor burden. It is also uncertain if there may be some potential chronic thrombus attached to the artery wall narrowing   the lumen. This was discussed with Dr. Mcpherson at the time of dictation.   2. Findings are consistent with progressive tumor burden throughout the right lung with increased right pleural effusion which now appears moderate to large in size, potentially malignant.   3. There appears to be a broken line in the patient's right  jugular vein      Signer Name: Susana Villagomez MD    Signed: 11/5/2021 8:31 PM    Workstation Name: [id number]     [facility]      CT Head Without Contrast   Final Result   No acute intracranial abnormality.               Signer Name: Susana Villagomez MD    Signed: 11/5/2021 8:14 PM    Workstation Name: [email or web address]     [facility]      XR Chest 1 View   Final Result   Findings appear consistent with increased right pleural effusion which appears to be partially layered. Spiculated lesions throughout the patient's right lung consistent with malignancy are partially visualized.      Signer Name: Susana Villagomez MD    Signed: 11/5/2021 5:42 PM    Workstation Name: [id number]     [facility]        Results for orders placed or performed during the hospital encounter of 11/05/21   COVID-19 and FLU A/B PCR - Swab, Nasopharynx    Specimen: Nasopharynx; Swab   Result Value Ref Range    COVID19 Not Detected Not Detected - Ref. Range    Influenza A PCR Not Detected Not Detected    Influenza B PCR Not Detected Not Detected   Blood Gas, Arterial With Co-Ox    Specimen: Arterial Blood   Result Value Ref Range    Site Right Brachial     Hernando's Test N/A     pH, Arterial 7.362 7.350 - 7.450 pH units    pCO2, Arterial 60.7 (H) 35.0 - 45.0 mm Hg    pO2, Arterial 62.9 (L) 83.0 - 108.0 mm Hg    HCO3, Arterial 34.5 (H) 20.0 - 26.0 mmol/L    Base Excess, Arterial 6.9 (H) 0.0 - 2.0 mmol/L    O2 Saturation, Arterial 90.4 (L) 94.0 - 99.0 %    Hemoglobin, Blood Gas 14.3 13.5 - 17.5 g/dL    Hematocrit, Blood Gas 44.0 38.0 - 51.0 %    Oxyhemoglobin 88.3 (L) 94 - 99 %    Methemoglobin 0.00 0.00 - 3.00 %    Carboxyhemoglobin 2.4 0 - 5 %    A-a Gradiant 117.1 0.0 - 300.0 mmHg    CO2 Content 36.3 (H) 22 - 33 mmol/L    Temperature 0.0 C    Barometric Pressure for Blood Gas 733 mmHg    Modality Nasal Cannula     FIO2 36 %    Flow Rate 4.0 lpm    Ventilator Mode NA     Note      Collected by  [id number]     pH, Temp Corrected      pCO2, Temperature Corrected      pO2, Temperature Corrected     Comprehensive Metabolic Panel    Specimen: Arm, Right; Blood   Result Value Ref Range    Glucose 110 (H) 65 - 99 mg/dL    BUN 46 (H) 8 - 23 mg/dL    Creatinine 1.11 (H) 0.57 - 1.00 mg/dL    Sodium 141 136 - 145 mmol/L    Potassium 4.9 3.5 - 5.2 mmol/L    Chloride 100 98 - 107 mmol/L    CO2 31.7 (H) 22.0 - 29.0 mmol/L    Calcium 8.5 (L) 8.6 - 10.5 mg/dL    Total Protein 7.2 6.0 - 8.5 g/dL    Albumin 3.39 (L) 3.50 - 5.20 g/dL    ALT (SGPT) 686 (H) 1 - 33 U/L    AST (SGOT) 260 (H) 1 - 32 U/L    Alkaline Phosphatase 161 (H) 39 - 117 U/L    Total Bilirubin 1.5 (H) 0.0 - 1.2 mg/dL    eGFR Non African Amer 49 (L) >60 mL/min/1.73    Globulin 3.8 gm/dL    A/G Ratio 0.9 g/dL    BUN/Creatinine Ratio 41.4 (H) 7.0 - 25.0    Anion Gap 9.3 5.0 - 15.0 mmol/L   BNP    Specimen: Arm, Right; Blood   Result Value Ref Range    proBNP 4,091.0 (H) 0.0 - 900.0 pg/mL   Troponin    Specimen: Arm, Right; Blood   Result Value Ref Range    Troponin T 0.147 (C) 0.000 - 0.030 ng/mL   Troponin    Specimen: Hand, Right; Blood   Result Value Ref Range    Troponin T 0.126 (C) 0.000 - 0.030 ng/mL   Lactic Acid, Plasma    Specimen: Arm, Right; Blood   Result Value Ref Range    Lactate 2.0 0.5 - 2.0 mmol/L   C-reactive Protein    Specimen: Arm, Right; Blood   Result Value Ref Range    C-Reactive Protein 10.34 (H) 0.00 - 0.50 mg/dL   T4, Free    Specimen: Arm, Right; Blood   Result Value Ref Range    Free T4 0.71 (L) 0.93 - 1.70 ng/dL   Phosphorus    Specimen: Arm, Right; Blood   Result Value Ref Range    Phosphorus 2.8 2.5 - 4.5 mg/dL   TSH    Specimen: Arm, Right; Blood   Result Value Ref Range    TSH 7.420 (H) 0.270 - 4.200 uIU/mL   Magnesium    Specimen: Arm, Right; Blood   Result Value Ref Range    Magnesium 2.3 1.6 - 2.4 mg/dL   CK    Specimen: Arm, Right; Blood   Result Value Ref Range    Creatine Kinase 55 20 - 180 U/L   Ammonia    Specimen: Arm,  Right; Blood   Result Value Ref Range    Ammonia 21 11 - 51 umol/L   CBC Auto Differential    Specimen: Blood   Result Value Ref Range    WBC 20.12 (H) 3.40 - 10.80 10*3/mm3    RBC 5.46 (H) 3.77 - 5.28 10*6/mm3    Hemoglobin 15.1 12.0 - 15.9 g/dL    Hematocrit 51.1 (H) 34.0 - 46.6 %    MCV 93.6 79.0 - 97.0 fL    MCH 27.7 26.6 - 33.0 pg    MCHC 29.5 (L) 31.5 - 35.7 g/dL    RDW 16.9 (H) 12.3 - 15.4 %    RDW-SD 57.1 (H) 37.0 - 54.0 fl    MPV 10.2 6.0 - 12.0 fL    Platelets 172 140 - 450 10*3/mm3    Neutrophil % 86.9 (H) 42.7 - 76.0 %    Lymphocyte % 2.8 (L) 19.6 - 45.3 %    Monocyte % 9.7 5.0 - 12.0 %    Eosinophil % 0.0 (L) 0.3 - 6.2 %    Basophil % 0.1 0.0 - 1.5 %    Immature Grans % 0.5 0.0 - 0.5 %    Neutrophils, Absolute 17.48 (H) 1.70 - 7.00 10*3/mm3    Lymphocytes, Absolute 0.56 (L) 0.70 - 3.10 10*3/mm3    Monocytes, Absolute 1.95 (H) 0.10 - 0.90 10*3/mm3    Eosinophils, Absolute 0.01 0.00 - 0.40 10*3/mm3    Basophils, Absolute 0.02 0.00 - 0.20 10*3/mm3    Immature Grans, Absolute 0.10 (H) 0.00 - 0.05 10*3/mm3    nRBC 0.3 (H) 0.0 - 0.2 /100 WBC   Blood Gas, Arterial With Co-Ox    Specimen: Arterial Blood   Result Value Ref Range    Site Right Radial     Hernando's Test Positive     pH, Arterial 7.234 (L) 7.350 - 7.450 pH units    pCO2, Arterial 80.8 (C) 35.0 - 45.0 mm Hg    pO2, Arterial 49.2 (C) 83.0 - 108.0 mm Hg    HCO3, Arterial 34.1 (H) 20.0 - 26.0 mmol/L    Base Excess, Arterial 3.8 (H) 0.0 - 2.0 mmol/L    O2 Saturation, Arterial 76.2 (L) 94.0 - 99.0 %    Hemoglobin, Blood Gas 14.1 13.5 - 17.5 g/dL    Hematocrit, Blood Gas 43.1 38.0 - 51.0 %    Oxyhemoglobin 74.8 (L) 94 - 99 %    Methemoglobin <-0.10 (L) 0.00 - 3.00 %    Carboxyhemoglobin 2.2 0 - 5 %    A-a Gradiant 108.8 0.0 - 300.0 mmHg    CO2 Content 36.6 (H) 22 - 33 mmol/L    Temperature 0.0 C    Barometric Pressure for Blood Gas 734 mmHg    Modality Nasal Cannula     FIO2 36 %    Flow Rate 4.0 lpm    Ventilator Mode NA     Note Read back and acknowledge      Notified Who DR RATNA GARCIA RN     Notified By [id number]     Notified Time 11/05/2021 22:06     Collected by [id number]     pH, Temp Corrected      pCO2, Temperature Corrected      pO2, Temperature Corrected     ECG 12 Lead   Result Value Ref Range    QT Interval 260 ms    QTC Interval 419 ms   Green Top (Gel)   Result Value Ref Range    Extra Tube Hold for add-ons.    Lavender Top   Result Value Ref Range    Extra Tube hold for add-on    Gold Top - SST   Result Value Ref Range    Extra Tube Hold for add-ons.    Light Blue Top   Result Value Ref Range    Extra Tube hold for add-on    Green Top (Gel)   Result Value Ref Range    Extra Tube Hold for add-ons.    Lavender Top   Result Value Ref Range    Extra Tube hold for add-on                ED Course  ED Course as of 11/06/21 0126   Fri Nov 05, 2021   1744 Patient has now developed atrial flutter.  EKG performed at 1735 shows atrial flutter with 2-1 AV conduction, ventricular rate 156.  QRS duration 98, QTc 419 ms.  Nonspecific ST-T changes.  No evidence for STEMI.  I have ordered Cardizem. [CM]   1958 Sent the patient's EKG to Dr. Parr for his review, we discussed the case, he advised electrical cardioversion and start Eliquis 5 mg twice daily.  After IV Versed, synchronized cardioversion x1 at 120 J successfully converted her to sinus rhythm, tolerated well.  Currently sinus rhythm at 87 on the monitor.  Repeat EKG is pending. [CM]   2007 EKG at 2004 shows sinus rhythm, rate 87.  MT interval 116, QRS duration 70, QTc 413 ms.  ST-T abnormality, possible inferolateral ischemia.  No evidence for STEMI. [CM]   2032 Still awaiting CT chest report.  Patient is tachycardic once again, uncertain rhythm on the monitor, rate 138.  Ordering repeat EKG. [CM]   2038 Now back to sinus rhythm at 82 on the monitor. [CM]   2116 I discussed the case with Dr. Gonzalez.  She is admitting patient to the hospitalist service. [CM]   2128 I discussed the situation with the patient and her sister,  regarding end-of-life care and life support therapy.  Patient voices that she does not want CPR, intubation or mechanical ventilation.  Her sister agrees with this DO NOT RESUSCITATE status. [CM]   2212 Patient's respiratory status worsening, starting her on BiPAP. [CM]   2247 Patient refuses to wear the BiPAP, keeps back taking it off, does not want to wear.  The sister does not want us to try to force it on her, she wants to put her back on nasal cannula and focus more on comfort measures.  Dr. Chou notified of this. [CM]      ED Course User Index  [CM] Yohannes Abebe MD                                           MDM  Number of Diagnoses or Management Options  Critical Care  Total time providing critical care: 30-74 minutes (45)      Final diagnoses:   Recurrent non-small cell lung cancer (HCC)   Acute on chronic respiratory failure with hypoxia and hypercapnia (HCC)   Atrial fibrillation and flutter (HCC)       ED Disposition  ED Disposition     ED Disposition Condition Comment    Decision to Admit  Level of Care: Progressive Care [20]   Diagnosis: Recurrent non-small cell lung cancer (HCC) [[id number]]   Admitting Physician: MCKENNA CHOU [[id number]]   Certification: I Certify That Inpatient Hospital Services Are Medically Necessary For Greater Than 2 Midnights            Please note that portions of this note were completed with a voice recognition program.            Yohannes Abebe MD  11/06/21 0126      Electronically signed by Yohannes Abebe MD at 11/06/21 0126     Saud Blount PCT at 11/05/21 2004        ekg performed and given to dr abebe at 2006     Saud Blount PCT  11/05/21 2006      Electronically signed by Saud Blount PCT at 11/05/21 2006     Maci Lowery, RN at 11/05/21 2755        Patient noted remove self from bi-pap multitiple times stating she does not want it on , patient and family educated by myself and  regarding risk and benefits, verbalized understanding. Patient  removed from bi-pap at this time and placed on 4lpm nasal cannula per Dr.Marcum SIMMONS.     Maci Lowery, RN  11/05/21 2252      Electronically signed by Maci Lowery RN at 11/05/21 2252         Current Facility-Administered Medications   Medication Dose Route Frequency Provider Last Rate Last Admin   • atropine sulfate injection  - ADS Override Pull            • cefepime 2 gm IVPB in 100 ml NS (VTB)  2 g Intravenous Q12H Mari Gonzalez DO   2 g at 11/06/21 0618   • dilTIAZem (CARDIZEM) 100 mg in 100 mL NS infusion (ADV)  5-15 mg/hr Intravenous Continuous Mari Gonzalez DO   Stopped at 11/06/21 1005   • heparin (porcine) 5000 UNIT/ML injection 1,400 Units  30 Units/kg Intravenous PRN Jaycob Eldridge DO       • heparin (porcine) 5000 UNIT/ML injection 2,800 Units  60 Units/kg Intravenous Once Jaycob Eldridge DO       • heparin (porcine) 5000 UNIT/ML injection 2,800 Units  60 Units/kg Intravenous PRN Jaycob Eldridge DO       • heparin 31928 units/250 mL (100 units/mL) in 0.45 % NaCl infusion  12 Units/kg/hr Intravenous Titrated Jaycob Eldridge DO       • influenza vac split quad (FLUZONE,FLUARIX,AFLURIA,FLULAVAL) injection 0.5 mL  0.5 mL Intramuscular Once Mari Gonzalez DO       • ipratropium (ATROVENT) nebulizer solution 0.5 mg  0.5 mg Nebulization 4x Daily - RT Mari Gonzalez DO   0.5 mg at 11/06/21 1322   • morphine injection 1 mg  1 mg Intravenous Q4H PRN Mari Gonzalez DO       • Pharmacy Consult - Pharmacy to dose   Does not apply Continuous PRN Mari Gonzalez DO       • Pharmacy Consult - Pharmacy to dose   Does not apply Continuous PRN Mari Gonzalez DO       • sodium chloride 0.9 % flush 10 mL  10 mL Intravenous PRN Mari Gonzalez DO       • sodium chloride 0.9 % flush 10 mL  10 mL Intravenous PRN aMri Gonzalez DO       • sodium chloride 0.9 % flush 10 mL  10 mL Intravenous Q12H Mari Gonzalez DO   10 mL at 11/06/21 0821   • sodium  chloride 0.9 % flush 10 mL  10 mL Intravenous PRN Mari Gonzalez DO       • vancomycin 1 g/250 mL 0.9% NS (vial-mate)  1,000 mg Intravenous Q24H Mari Gonzalez DO         Orders (last 24 hrs)      Start     Ordered    11/06/21 1800  vancomycin 1 g/250 mL 0.9% NS (vial-mate)  Every 24 Hours         11/05/21 1811    11/06/21 1334  Diet Instructions To Nursing  Until Discontinued        Comments: NPO when on BiPAP. Meds whole in puree/thins.    11/06/21 1333    11/06/21 1333  Diet Pureed; Thin  Diet Effective Now         11/06/21 1333    11/06/21 1333  Must Be Up For Meals  Until Discontinued        Comments: Upright in bed to 90 degrees for all meals/po intake.    11/06/21 1333    11/06/21 1329  SLP Consult: Eval & Treat Swallow Disorder; Regular - No Dietary Restrictions  Once        Comments: Patient ok to come off bipap to basal cannula for evaluation.  Has improved from admission    11/06/21 1333    11/06/21 1230  heparin (porcine) 5000 UNIT/ML injection 2,800 Units  Once         11/06/21 1218    11/06/21 1230  heparin 13741 units/250 mL (100 units/mL) in 0.45 % NaCl infusion  Titrated         11/06/21 1218    11/06/21 1217  Inpatient Cardiology Consult  Once        Specialty:  Cardiology  Provider:  Odalys Murrieta MD    11/06/21 1218    11/06/21 1216  Initiate Heparin Protocol  Until Discontinued         11/06/21 1218    11/06/21 1216  Heparin Nomogram / Protocol Adjustment  Once         11/06/21 1218    11/06/21 1216  Notify Provider Platelet Count Less Than 67883  Until Discontinued         11/06/21 1218    11/06/21 1216  Notify Provider if PTT Not in Therapeutic Range After 24 Hours  Until Discontinued         11/06/21 1218    11/06/21 1216  Stop Infusion & Notify Provider if Bleeding Occurs  Until Discontinued         11/06/21 1218    11/06/21 1216  Protime-INR  Once        Comments: Prior to Initial Heparin Bolus      11/06/21 1218    11/06/21 1216  aPTT  Once        Comments: Prior  to Initial Heparin Bolus      11/06/21 1218    11/06/21 1215  heparin (porcine) 5000 UNIT/ML injection 2,800 Units  As Needed         11/06/21 1218    11/06/21 1215  heparin (porcine) 5000 UNIT/ML injection 1,400 Units  As Needed         11/06/21 1218    11/06/21 1143  SLP Consult: Eval & Treat Swallow Disorder; Gluten Free  Once,   Status:  Canceled        Comments: Patient ok to come off bipap to basal cannula for evaluation.  Has improved from admission    11/06/21 1142    11/06/21 1058  NIPPV (CPAP or BIPAP)  Until Discontinued         11/06/21 1057    11/06/21 1018  ECG 12 Lead  STAT         11/06/21 1018    11/06/21 1005  atropine sulfate injection  - ADS Override Pull        Note to Pharmacy: Created by cabinet override    11/06/21 1005    11/06/21 0900  influenza vac split quad (FLUZONE,FLUARIX,AFLURIA,FLULAVAL) injection 0.5 mL  Once         11/06/21 0138    11/06/21 0847  US Venous Doppler Lower Extremity Bilateral (duplex)  1 Time Imaging         11/06/21 0846    11/06/21 0847  US Arterial Doppler Lower Extremity Bilateral  1 Time Imaging         11/06/21 0846    11/06/21 0800  metroNIDAZOLE (FLAGYL) 500 mg/100mL IVPB  Every 8 Hours,   Status:  Discontinued         11/06/21 0642    11/06/21 0745  digoxin (LANOXIN) injection 250 mcg  Once         11/06/21 0654    11/06/21 0738  Hepatitis Panel, Acute  Once         11/06/21 0655    11/06/21 0736  scd pump  Once         11/06/21 0735    11/06/21 0730  furosemide (LASIX) injection 20 mg  Once         11/06/21 0632    11/06/21 0700  ipratropium (ATROVENT) nebulizer solution 0.5 mg  4 Times Daily - RT         11/06/21 0631    11/06/21 0643  MRSA Screen, PCR (Inpatient) - Swab, Nares  Once         11/06/21 0642    11/06/21 0631  Scan Slide  Once         11/06/21 0630    11/06/21 0600  cefepime 2 gm IVPB in 100 ml NS (VTB)  Every 12 Hours         11/05/21 1811    11/06/21 0600  CBC & Differential  Morning Draw         11/06/21 0131    11/06/21 0600  Comprehensive  Metabolic Panel  Morning Draw         11/06/21 0131    11/06/21 0600  CBC Auto Differential  PROCEDURE ONCE         11/06/21 0131    11/06/21 0445  morphine injection 1 mg  Once         11/06/21 0347    11/06/21 0402  morphine injection 1 mg  Every 4 Hours PRN         11/06/21 0403    11/06/21 0400  Vital Signs  Every 4 Hours      Comments: Per per hospital policy    11/06/21 0131    11/06/21 0230  sodium chloride 0.9 % flush 10 mL  Every 12 Hours Scheduled         11/06/21 0131 11/06/21 0136  Inpatient Case Management  Consult  Once        Provider:  (Not yet assigned)    11/06/21 0135    11/06/21 0132  Notify Physician (with Parameters)  Until Discontinued         11/06/21 0131 11/06/21 0132  Intake & Output  Every Shift       11/06/21 0131 11/06/21 0132  Weigh patient  Once         11/06/21 0131 11/06/21 0132  Oxygen Therapy- Nasal Cannula; Titrate for SPO2: 90% - 95%  Continuous         11/06/21 0131 11/06/21 0132  Insert Peripheral IV  Once         11/06/21 0131 11/06/21 0132  Saline Lock & Maintain IV Access  Continuous         11/06/21 0131 11/06/21 0132  VTE Prophylaxis Not Indicated: Other: Patient Currently Anticoagulated / Receiving Prophylaxis  Once         11/06/21 0131    11/06/21 0132  Turn Patient  Now Then Every 2 Hours         11/06/21 0131 11/06/21 0132  Fall Precautions  Continuous         11/06/21 0131 11/06/21 0132  NPO Diet  Diet Effective Now,   Status:  Canceled         11/06/21 0131 11/06/21 0131  sodium chloride 0.9 % flush 10 mL  As Needed         11/06/21 0131 11/06/21 0033  Inpatient Admission  Once         11/06/21 0032    11/06/21 0033  Code Status and Medical Interventions:  Continuous         11/06/21 0032 11/05/21 2327  Oxygen Therapy- High Flow Nasal Cannula; 6 LPM; Titrate for SPO2: 88% - 92%  Continuous PRN,   Status:  Canceled       11/05/21 2327 11/05/21 2254  digoxin (LANOXIN) injection 250 mcg  Once         11/05/21 2252     11/05/21 2205  NIPPV (CPAP or BIPAP)  Until Discontinued,   Status:  Canceled        Comments: Start with FiO2 40%.  Titrate to keep O2 saturation 92%.    11/05/21 2205 11/05/21 2203  Blood Gas, Arterial With Co-Ox  PROCEDURE ONCE         11/05/21 2201 11/05/21 2159  digoxin (LANOXIN) injection 250 mcg  Once         11/05/21 2157 11/05/21 2148  Blood Gas, Arterial -With Co-Ox Panel: Yes  Once         11/05/21 2147 11/05/21 2044  ECG 12 Lead  Once         11/05/21 2043 11/05/21 2037  ECG 12 Lead  Once,   Status:  Canceled         11/05/21 2037 11/05/21 2030  apixaban (ELIQUIS) tablet 5 mg  Once         11/05/21 1959 11/05/21 2009  midazolam (VERSED) injection 0.5 mg  Once         11/05/21 2007 11/05/21 2004  ECG 12 Lead  STAT         11/06/21 0540    11/05/21 1950  Midazolam HCl (PF) (VERSED) 2 MG/2ML injection  - ADS Override Pull  Status:  Discontinued        Note to Pharmacy: Created by cabinet override    11/05/21 1950    11/05/21 1944  iopamidol (ISOVUE-370) 76 % injection 100 mL  Once in Imaging         11/05/21 1942    11/05/21 1914  CT Head Without Contrast  1 Time Imaging         11/05/21 1913    11/05/21 1819  CT Abdomen Pelvis With Contrast  1 Time Imaging        Comments: IV CONTRAST ONLY      11/05/21 1818    11/05/21 1818  CT Chest Pulmonary Embolism  1 Time Imaging         11/05/21 1818    11/05/21 1813  cefepime 2 gm IVPB in 100 ml NS (VTB)  Once         11/05/21 1811    11/05/21 1800  vancomycin 1 g/250 mL 0.9% NS (vial-mate)  Once         11/05/21 1753    11/05/21 1739  dilTIAZem (CARDIZEM) bolus from bag 1 mg/mL 10 mg  Once         11/05/21 1737    11/05/21 1739  dilTIAZem (CARDIZEM) 100 mg in 100 mL NS infusion (ADV)  Continuous         11/05/21 1737    11/05/21 1716  Pharmacy Consult - Pharmacy to dose  Continuous PRN         11/05/21 1717    11/05/21 1716  Pharmacy Consult - Pharmacy to dose  Continuous PRN         11/05/21 1717    11/05/21 1714  sodium chloride 0.9 %  "bolus 1,000 mL  Once         11/05/21 1712    11/05/21 1714  sodium chloride 0.9 % infusion  Continuous,   Status:  Discontinued         11/05/21 1712    11/05/21 1658  CBC Auto Differential  Once         11/05/21 1657    11/05/21 1647  COVID PRE-OP / PRE-PROCEDURE SCREENING ORDER (NO ISOLATION) - Swab, Nasopharynx  Once         11/05/21 1648    11/05/21 1647  Ammonia  STAT         11/05/21 1648    11/05/21 1647  COVID-19 and FLU A/B PCR - Swab, Nasopharynx  PROCEDURE ONCE         11/05/21 1648    11/05/21 1644  C-reactive Protein  Once         11/05/21 1648    11/05/21 1644  T4, Free  Once         11/05/21 1648    11/05/21 1644  Phosphorus  Once         11/05/21 1648    11/05/21 1644  TSH  Once         11/05/21 1648    11/05/21 1644  Magnesium  Once         11/05/21 1648    11/05/21 1644  CK  Once         11/05/21 1648    11/05/21 1644  ECG 12 Lead  Once         11/05/21 1648    11/05/21 1644  XR Chest 1 View  1 Time Imaging         11/05/21 1648    11/05/21 1644  Monitor Blood Pressure  Per Hospital Policy         11/05/21 1648    11/05/21 1644  Cardiac Monitoring  Once         11/05/21 1648    11/05/21 1644  Pulse Oximetry, Continuous  Continuous         11/05/21 1648    11/05/21 1644  Oxygen Therapy- Nasal Cannula; 4 LPM; Titrate for SPO2: equal to or greater than, 92%  Continuous,   Status:  Canceled         11/05/21 1648    11/05/21 1643  Blood Gas, Arterial With Co-Ox  PROCEDURE ONCE         11/05/21 1641    11/05/21 1643  Insert peripheral IV  Once        \"And\" Linked Group Details    11/05/21 1648    11/05/21 1643  CBC & Differential  Once         11/05/21 1648    11/05/21 1643  Comprehensive Metabolic Panel  Once         11/05/21 1648    11/05/21 1643  Urinalysis With Culture If Indicated - Urine, Catheter  Once         11/05/21 1648    11/05/21 1643  Zenia Draw  Once         11/05/21 1648    11/05/21 1643  Blood Gas, Arterial -With Co-Ox Panel: Yes  Once         11/05/21 1648    11/05/21 1643  BNP  Once " "        11/05/21 1648    11/05/21 1643  Troponin  Now Then Every 2 Hours       11/05/21 1648    11/05/21 1643  Blood Culture - Blood, Arm, Right  Once         11/05/21 1648    11/05/21 1643  Blood Culture - Blood, Wrist, Left  Once         11/05/21 1648    11/05/21 1643  Lactic Acid, Plasma  Once         11/05/21 1648    11/05/21 1643  Green Top (Gel)  PROCEDURE ONCE         11/05/21 1648    11/05/21 1643  Lavender Top  PROCEDURE ONCE         11/05/21 1648    11/05/21 1643  Gold Top - SST  PROCEDURE ONCE,   Status:  Canceled         11/05/21 1648    11/05/21 1643  Light Blue Top  PROCEDURE ONCE,   Status:  Canceled         11/05/21 1648    11/05/21 1642  sodium chloride 0.9 % flush 10 mL  As Needed        \"And\" Linked Group Details    11/05/21 1648    11/05/21 1640  Insert peripheral IV  Once         11/05/21 1640    11/05/21 1640  [place] Draw  Once         11/05/21 1640    11/05/21 1640  Green Top (Gel)  PROCEDURE ONCE         11/05/21 1640    11/05/21 1640  Lavender Top  PROCEDURE ONCE         11/05/21 1640    11/05/21 1640  Gold Top - SST  PROCEDURE ONCE         11/05/21 1640    11/05/21 1640  Light Blue Top  PROCEDURE ONCE         11/05/21 1640    11/05/21 1639  sodium chloride 0.9 % flush 10 mL  As Needed         11/05/21 1640    Unscheduled  aPTT  As Needed       11/06/21 1218    Unscheduled  RN To Release aPTT Order 6 Hours After Heparin Bolus & 6 Hours After Any Heparin Rate Change  As Needed       11/06/21 1218    Unscheduled  After 2 Consecutive Therapeutic aPTTs, Obtain aPTT Daily.  If a Rate Adjustment is Necessary, Resume Every 6 Hour aPTT Draws  As Needed       11/06/21 1218    Unscheduled  Assist With Feeding Patient  As Needed       11/06/21 1333    --  hydrOXYzine (ATARAX) 25 MG tablet  Nightly         11/06/21 1341    --  HYDROcodone-acetaminophen (NORCO) 7.5-325 MG per tablet  2 Times Daily PRN         11/06/21 1341    --  amLODIPine (NORVASC) 5 MG tablet  Daily         11/06/21 1341    --  " diazePAM (VALIUM) 10 MG tablet  2 Times Daily PRN         11/06/21 1341    --  gabapentin (NEURONTIN) 600 MG tablet  2 Times Daily         11/06/21 1341    --  albuterol sulfate  (90 Base) MCG/ACT inhaler  Every 4 Hours PRN         11/06/21 1341    --  ketoconazole (NIZORAL) 2 % shampoo  Daily         11/06/21 1341    --  clobetasol (TEMOVATE) 0.05 % cream  Daily         11/06/21 1341    Pending  hydrOXYzine (ATARAX) tablet 25 mg  Nightly         Pending    Pending  HYDROcodone-acetaminophen (NORCO) 7.5-325 MG per tablet 1 tablet  2 Times Daily PRN         Pending    Pending  amLODIPine (NORVASC) tablet 5 mg  Daily         Pending    Pending  diazePAM (VALIUM) tablet 10 mg  2 Times Daily PRN         Pending    Pending  gabapentin (NEURONTIN) capsule 600 mg  Every 12 Hours Scheduled         Pending    Pending  albuterol (PROVENTIL) nebulizer solution 0.083% 2.5 mg/3mL  Every 4 Hours PRN         Pending    Pending  clobetasol (TEMOVATE) 0.05 % cream 1 application  Daily         Pending                Physician Progress Notes (last 24 hours)  Notes from 11/05/21 1345 through 11/06/21 1345   No notes of this type exist for this encounter.         Consult Notes (last 24 hours)  Notes from 11/05/21 1345 through 11/06/21 1345   No notes of this type exist for this encounter.

## 2021-11-07 NOTE — PROGRESS NOTES
[facility] HOSPITALIST PROGRESS NOTE     Patient Identification:  Name:  Sobeida Light  Age:  71 y.o.  Sex:  female  :  1950  MRN:  [id number]  Visit Number:  [id number]  ROOM: [address]     Primary Care Provider:  Provider, No Known    Length of stay in inpatient status:  1    Subjective     Chief Compliant:    Chief Complaint   Patient presents with   • Weakness - Generalized       History of Presenting Illness: Patient seen and evaluated after being admitted earlier this morning by overnight hospitalist for acute hypoxic and hypercapnic respiratory failure secondary to sepsis with right-sided postobstructive pneumonia and progressive worsening right-sided pleural effusion with new onset atrial fibrillation/flutter in the setting of 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 Patient overnight with CT of the aorta with runoff coming back with significant severe peripheral arterial disease the cardiology recommended would need vascular surgery. Overnight hospitalist discussed findings with patient and her sister due to patient's mental status and patient transition to comfort measures. Patient was kept on heparin drip and antibiotics however heparin was then later stopped when patient began to have hematemesis. At time of exam patient slightly confused but minimally arousable but appears to be in no discomfort.    Objective     Current Hospital Meds:cefepime, 2 g, Intravenous, Q12H  influenza vaccine, 0.5 mL, Intramuscular, Once  ipratropium, 0.5 mg, Nebulization, 4x Daily - RT  sodium chloride, 10 mL, Intravenous, Q12H  vancomycin, 1,000 mg, Intravenous, Q24H    Pharmacy Consult - Pharmacy to dose,   Pharmacy Consult - Pharmacy to dose,         Current Antimicrobial Therapy:  Anti-Infectives (From admission, onward)    Ordered     Dose/Rate Route Frequency Start Stop    21 1811  vancomycin 1 g/250 mL 0.9% NS (vial-mate)        Ordering Provider: Mari Gonzalez DO     1,000 mg  over 60 Minutes Intravenous Every 24 Hours 11/06/21 1800 11/12/21 1759    11/05/21 1811  cefepime 2 gm IVPB in 100 ml NS (VTB)        Ordering Provider: Mari Gonzalez DO    2 g  over 4 Hours Intravenous Every 12 Hours 11/06/21 0600 11/13/21 0559    11/05/21 1811  cefepime 2 gm IVPB in 100 ml NS (VTB)        Ordering Provider: Yohannes Franco MD    2 g  over 30 Minutes Intravenous Once 11/05/21 1813 11/05/21 1905    11/05/21 1753  vancomycin 1 g/250 mL 0.9% NS (vial-mate)        Ordering Provider: Yohannes Franco MD    20 mg/kg × 46.7 kg  over 60 Minutes Intravenous Once 11/05/21 1800 11/05/21 2005        Current Diuretic Therapy:  Diuretics (From admission, onward)    Ordered     Dose/Rate Route Frequency Start Stop    11/06/21 0632  furosemide (LASIX) injection 20 mg        Ordering Provider: Mari Gonzalez DO    20 mg Intravenous Once 11/06/21 0730 11/06/21 0821        ----------------------------------------------------------------------------------------------------------------------  Vital Signs:  Temp:  [97.5 °F (36.4 °C)-98.5 °F (36.9 °C)] 98.1 °F (36.7 °C)  Heart Rate:  [52-62] 57  Resp:  [14-22] 16  BP: ()/() 119/61  SpO2:  [91 %-100 %] 97 %  on  Flow (L/min):  [8-15] 15;   Device (Oxygen Therapy): bubble; high-flow nasal cannula  Body mass index is 26.21 kg/m².    Wt Readings from Last 3 Encounters:   11/07/21 60.9 kg (134 lb 3.2 oz)   08/23/21 60.1 kg (132 lb 6.4 oz)   08/03/21 60.2 kg (132 lb 12.8 oz)     Intake & Output (last 3 days)       11/04 0701 11/05 0700 11/05 0701 11/06 0700 11/06 0701 11/07 0700 11/07 0701 11/08 0700    P.O.   360     I.V. (mL/kg)  561 (12) 563.9 (9.3)     IV Piggyback  1350      Total Intake(mL/kg)  1911 (40.8) 923.9 (15.2)     Urine (mL/kg/hr)  0 685 (0.5)     Total Output  0 685     Net  +1911 +238.9                 Diet Pureed;  Thin  ----------------------------------------------------------------------------------------------------------------------  Physical exam:  Constitutional: Frail chronically ill-appearing elderly adult female resting in bed with nasal cannula in place.  HENT:  Head:  Normocephalic and atraumatic.  Mouth:  Moist mucous membranes.    Eyes:  Conjunctivae and EOM are normal. No scleral icterus.  .    Cardiovascular: Bradycardic with no murmur, rub, or gallop  Pulmonary/Chest:  No respiratory distress, there are coarse breath sounds heard throughout with by basilar crackles but significantly more so on the right compared to the left. Port-A-Cath in place in left upper chest wall.  Abdominal:  Soft.  Bowel sounds are hypoactive.  No distension and no tenderness.   Musculoskeletal:  No edema, no tenderness, and no deformity.  No red or swollen joints anywhere.    Neurological: Somnolent but arousable but minimally conversant.    Skin:  Skin is warm and dry with the exception of the feet which have ongoing progressive discoloration of the toes and distal aspect of the foot with progression from a deep dark purple to almost black color. No palpable pulse present.  ----------------------------------------------------------------------------------------------------------------------  Tele:    ----------------------------------------------------------------------------------------------------------------------  Results from last 7 days   Lab Units 11/07/21  0021 11/06/21  1558 11/06/21  1331 11/06/21  0500 11/05/21  1720 11/05/21  1640   CRP mg/dL  --   --   --   --  10.34*  --    LACTATE mmol/L  --  1.7  --   --  2.0  --    WBC 10*3/mm3 20.96*  --   --  22.25*  --  20.12*   HEMOGLOBIN g/dL 13.1  --   --  13.7  --  15.1   HEMATOCRIT % 47.7*  --   --  48.8*  --  51.1*   MCV fL 100.2*  --   --  97.6*  --  93.6   MCHC g/dL 27.5*  --   --  28.1*  --  29.5*   PLATELETS 10*3/mm3 180  --   --  159  --  172   INR   --   --  1.49*  --    --   --      Results from last 7 days   Lab Units 11/05/21  2201   PH, ARTERIAL pH units 7.234*   PO2 ART mm Hg 49.2*   PCO2, ARTERIAL mm Hg 80.8*   HCO3 ART mmol/L 34.1*     Results from last 7 days   Lab Units 11/07/21  0021 11/06/21  1558 11/06/21  0500 11/05/21  1720 11/05/21  1720   SODIUM mmol/L 137 138 142   < > 141   POTASSIUM mmol/L 4.9 6.0* 5.1   < > 4.9   MAGNESIUM mg/dL  --   --   --   --  2.3   CHLORIDE mmol/L 99 103 103   < > 100   CO2 mmol/L 25.7 25.1 28.0   < > 31.7*   BUN mg/dL 40* 38* 37*   < > 46*   CREATININE mg/dL 1.33* 1.12* 0.84   < > 1.11*   EGFR IF NONAFRICN AM mL/min/1.73 40* 48* 67   < > 49*   CALCIUM mg/dL 8.4* 8.2* 8.1*   < > 8.5*   PHOSPHORUS mg/dL  --   --   --   --  2.8   GLUCOSE mg/dL 125* 119* 138*   < > 110*   ALBUMIN g/dL 3.29*  --  3.42*  --  3.39*   BILIRUBIN mg/dL 0.8  --  1.2  --  1.5*   ALK PHOS U/L 133*  --  156*  --  161*   AST (SGOT) U/L 88*  --  153*  --  260*   ALT (SGPT) U/L 426*  --  544*  --  686*    < > = values in this interval not displayed.   Estimated Creatinine Clearance: 32.6 mL/min (A) (by C-G formula based on SCr of 1.33 mg/dL (H)).  Ammonia   Date Value Ref Range Status   11/05/2021 21 11 - 51 umol/L Final     Results from last 7 days   Lab Units 11/05/21 2005 11/05/21  1720   CK TOTAL U/L  --  55   TROPONIN T ng/mL 0.126* 0.147*     Results from last 7 days   Lab Units 11/05/21  1720   PROBNP pg/mL 4,091.0*         No results found for: HGBA1C, POCGLU  Lab Results   Component Value Date    TSH 7.420 (H) 11/05/2021    FREET4 0.71 (L) 11/05/2021     No results found for: PREGTESTUR, PREGSERUM, HCG, HCGQUANT  Pain Management Panel     Pain Management Panel Latest Ref Rng & Units 11/17/2020 6/18/2017    AMPHETAMINES SCREEN, URINE Negative Negative Negative    BARBITURATES SCREEN Negative Negative Negative    BENZODIAZEPINE SCREEN, URINE Negative Positive(A) Positive(A)    BUPRENORPHINEUR Negative Negative Negative    COCAINE SCREEN, URINE Negative Negative  Negative    METHADONE SCREEN, URINE Negative Negative Negative        Brief Urine Lab Results  (Last result in the past 365 days)      Color   Clarity   Blood   Leuk Est   Nitrite   Protein   CREAT   Urine HCG        11/06/21 1702 Dark Yellow   Clear   Negative   Trace   Negative   Trace               No results found for: BLOODCX  No results found for: URINECX  No results found for: WOUNDCX  No results found for: STOOLCX  No results found for: RESPCX  No results found for: AFBCX  Results from last 7 days   Lab Units 11/06/21  1558 11/05/21  1720   LACTATE mmol/L 1.7 2.0   CRP mg/dL  --  10.34*       I have personally looked at the labs and they are summarized above.  ----------------------------------------------------------------------------------------------------------------------  Detailed radiology reports for the last 24 hours:    Imaging Results (Last 24 Hours)     Procedure Component Value Units Date/Time    CT Angio Abdominal Aorta Bilateral Iliofem Runoff [[id number]] Collected: 11/06/21 2147     Updated: 11/06/21 2149    Narrative:      CTA ABDOMEN, PELVIS AND LOWER EXTREMITY RUNOFF,    HISTORY:  Claudication and leg ischemia    TECHNIQUE:  CT angiogram of the abdomen and pelvis with lower extremity runoff using IV contrast. 3-D postprocessing was performed and reviewed. Radiation dose reduction techniques included automated exposure control or exposure modulation based on body size.  Radiation audit for CT and nuclear cardiology exams in the last 12 months: .    AORTOILIAC VESSELS:  The celiac trunk appears patent. There is a high-grade near occlusive stenosis involving the SMA, although the distal branches appear patent. There is an infrarenal abdominal aortic aneurysm measuring slightly over 3 cm with significant mural thrombus.  There may be some stenosis involving the proximal renal arteries. The distal branches appear patent. The left renal pelvis is dilated. The left common iliac artery appears  occluded. There appears to be some reconstitution involving the left external  iliac with multifocal high-grade narrowing.     RIGHT LEG VESSELS:  The right external iliac is patent. Plaque is identified involving the right femoral bifurcation. There is multifocal narrowing along the right superficial femoral that extends to involve the popliteal. The trifurcation vessels are patent although there  appears to be some narrowing and attenuation particularly distally in the region of the ankle.    LEFT LEG VESSELS:  The common femoral duct demonstrates high-grade narrowing that extends to the bifurcation. High-grade narrowing is identified along the patient's left SFA with severe stenosis. The popliteal also appears grossly diminutive but patent. Trifurcation  vessels demonstrate significant attenuation in the region of the ankle.    ABDOMEN FINDINGS:  There is a large right pleural effusion. Liver, spleen and pancreas appear unremarkable.    PELVIS FINDINGS:  A small amount of fluid is seen in the pelvis. There is diverticulosis without definite evidence of diverticulitis.      Impression:      1. There is multilevel significant peripheral vascular disease that appears greater on the left. The proximal left common iliac artery appears occluded. High-grade focal narrowing is seen involving the left femoral vessels and popliteal artery. There is  significant attenuation of the trifurcation vessels at the level of the ankle.   2. High-grade multifocal narrowing is also identified involving the right superficial femoral artery and popliteal artery. There also appears to be some attenuation of the trifurcation vessels at the level of the ankle.  3. Mild infrarenal abdominal aortic aneurysm with significant intramural plaque.  4. High-grade nearly occlusive narrowing at the origin of the SMA  5. 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 Chronic thrombus of right main pulmonary artery ??  Chronic obstructive pulmonary disease  Ongoing tobacco use    -Patient with significant hypercapnia at admission now maintaining O2 saturation on 15 L bubble high flow-Patient with moderate to large right pleural effusion that is new compared to previous imaging in July 2021, patient would potentially benefit from thoracentesis although there is significant risk for lack of reexpansion as the chronicity of her pleural effusion is unclear at this time and there appears to be a notable rind along the atelectatic inferior portion of the lung.     -Continue broad-spectrum coverage with Vanco and cefepime at this time    -As there is questionable chronic thrombus of the right main pulmonary artery as well as on notable on CT of tumor burden likely causing some compression of the artery was initially placed on heparin gtt. however developed hematemesis and this has been stopped.    -Patient transitioned to comfort measures overnight due to inability to remain on heparin drip as well as requiring vascular surgery for severe peripheral arterial disease with the patient and family stating that they would not want any surgical intervention.    -Patient would benefit from palliative care consultation when available, to discuss this with patient's sister and is agreeable.    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    -CTA angio of the abdominal aorta bilaterally with iliofemoral runoff showed multilevel significant peripheral vascular disease left greater than right with proximal left common iliac artery occluded and high-grade focal narrowing involving the left femoral and popliteal artery with significant continuation of the trifurcation vessels at the level of the ankle. There is also noted high-grade multifocal narrowing involving the right superficial femoral artery and popliteal artery with what appears to be some attenuation of the trifurcation vessels at the level of the ankle. Noted also is a high-grade nearly occlusive narrowing at the origin of the SMA and mild infrarenal abdominal aortic aneurysm with significant intramural plaque.    -Given this information overnight family and patient did not want any surgical invention and instead opted for comfort measures given her also concurrent malignancy.    -Heparin GTT held secondary to hematemesis and comfort measures.    New onset atrial fibrillation/flutter  Spontaneous conversion to bradycardia    -Patient status post Cardizem infusion at max dose of 15 mg an hour as well as administration of intravenous digoxin as well as cardioversion in the emergency department with still persistent refractory atrial fibrillation/flutter.    -Patient spontaneous conversion on Cardizem and actually becoming bradycardic during conversion and Cardizem discontinued.    -Patient now comfort measures as above    Ulcerative dermatitis    -Patient with multiple scattered areas with previous pathology showing lesions consistent with ulcerative dermatitis (pickers nodule)    -Supportive care    Anxiety/depression    -Ativan as needed for comfort    Essential hypertension    -Patient normotensive and will hold home antihypertensives.    VTE Prophylaxis:   Mechanical Order History:     None      Pharmalogical Order History:      Ordered     Dose Route Frequency  Stop    11/06/21 1218  heparin (porcine) 5000 UNIT/ML injection 2,800 Units         60 Units/kg IV Once 11/06/21 1406    11/06/21 1218  heparin 48532 units/250 mL (100 units/mL) in 0.45 % NaCl infusion  5.61 mL/hr         12 Units/kg/hr IV Titrated --    11/06/21 1218  heparin (porcine) 5000 UNIT/ML injection 2,800 Units         60 Units/kg IV As Needed --    11/06/21 1218  heparin (porcine) 5000 UNIT/ML injection 1,400 Units         30 Units/kg IV As Needed --    11/05/21 1959  apixaban (ELIQUIS) tablet 5 mg         5 mg PO Once 11/05/21 2024                Disposition patient now comfort measures and would benefit from palliative care consult tomorrow to discuss attempting to return home with hospice.    Jaycob Eldridge DO  [facility] Hospitalist  11/07/21  14:12 EST

## 2021-11-07 NOTE — PROGRESS NOTES
Hospitalist Update:    Contacted by on-call cardiologist Dr Murrieta about this patient. He had been consulted to see the patient in regards to her peripheral artery disease, asa her feet were noted to be dusky earlier in the day and arterial US showed elevated velocities in the right lower extremity with plaque formation throughout the major arteries and no flow demonstrated in the distal posterial tibial artery, along with diminished velocities in the left lower extremity with monophasic flow seen throughout and minimal flow demonstrated in the posterior tibial artery. Dr Murrieta recommended transfer to facility with vascular/CT surgery for further evaluation unless patient and family declined further treatment, which based on discussion that took place between Dr Eldridge and family earlier in the day, it was suspected this might be the case. I spoke to patient at bedside with her RN Mariposa accompanying me, and the patient did not want to be transferred or have any further evaluation or treatment regarding her PAD. Patient is also agreeable to transitioning to comfort measures. However, in patient's current condition where she is very sick and somewhat lethargic, her decision making capacity is in question. Therefore I called the patient's next of kin in her chart, Candace Fischer (sister), to make sure family was on board with her wishes. I explained cardiology's concerns for critical limb ischemia. I explained that evaluating and intervening on this matter further would require transfer to another facility with CT/vascular surgery. Candace understands the extent of patient's overall condition and declines transfer for further evaluation at this time. She states patient has been adamant that she is DNR/DNI and even shared with Candace that she didn't think she wanted to pursue thoracentesis for her large pleural effusion which is highly suspected to be malignant. Based on these wishes that the patient has clearly  stated to Candace, Candace does not think the patient would agree to transfer for further evaluation and treatment of limb ischemia and supports the patient's decision. The patient and family had been considering comfort measures already per Dr Eldridge's progress note from earlier today; I readdressed this with Candace now based on new information of suspected limb ischemia, and Candace has agreed to change the patient from limited interventions to comfort measures. Will continue current management including antibiotics and anticoagulation, but will not escalate care beyond this point. I have made Dr Murrieta aware of this decision.

## 2021-11-07 NOTE — CONSULTS
CONSULTDOC@    Patient Identification:    Name:  Sobeida Light  Age:  71 y.o.  Sex:  female  :  1950  MRN:  [id number]  Visit Number:  [id number]  Primary care provider:  Provider, No Known    Reason for consultation:   Atrial fibrillation converted to junctional bradycardia   Tibial artery occlusion; possible thrombus in pulmonary artery  History of presenting illness:     71-year-old female with known history of squamous cell lung cancer diagnosed in  with recurrence with metastatic disease.  She also has hypertension and COPD.  She presents with increasing fatigue and weakness.  In the ER patient was in atrial flutter with 2 : 1 conduction.  She was initiated on Cardizem infusion.  ER physician called me and as the rate continued to be elevated advised cardioversion and this converted her to sinus bradycardia/junctional bradycardia.  She has since been initiated on Eliquis.    Patient is DNI and no CPR  CT chest demonstrated narrowing of the right main pulmonary artery secondary to tumor burden.  There is a possibility of chronic thrombus attached to the arterial wall.  There is progressive increased tumor burden throughout the right lung field with increased right pleural effusion she is now large in size.  This correlates with diminished air entry in that side    Her arterial Doppler demonstrates no flow in the right distal posterior tibial artery.  There is minimal flow in the left posterior tibial artery.  Both her feet are cold and dusky.  No pulses are felt.  She is currently on heparin    Latest EKG which was performed today at 7 PM demonstrates sinus bradycardia T wave inversions in inferior and anterolateral leads.  The previous EKG demonstrated junctional rhythm.  I just reviewed her echocardiogram which demonstrated preserved LV systolic function with eccentric hypertrophy, biatrial enlargement, moderate pulmonary hypertension and a dilated IVC without any inspiratory  collapse.  ---------------------------------------------------------------------------------------------------------------------  ROS: All systems reviewed and negative except noted above.  ---------------------------------------------------------------------------------------------------------------------   Past History:   History reviewed. No pertinent family history.  Past Medical History:   Diagnosis Date   • Ankle fracture    • Anxiety    • Anxiety    • Arthritis    • COPD (chronic obstructive pulmonary disease) (HCC)    • Depression    • Hypertension    • Lung cancer (HCC)    • Wrist fracture      Past Surgical History:   Procedure Laterality Date   • ANKLE SURGERY     • CHOLECYSTECTOMY     • TONSILLECTOMY       Social History     Socioeconomic History   • Marital status:    Tobacco Use   • Smoking status: Current Every Day Smoker     Packs/day: 0.50     Types: Cigarettes   • Smokeless tobacco: Never Used   Vaping Use   • Vaping Use: Never used   Substance and Sexual Activity   • Alcohol use: No   • Drug use: No   • Sexual activity: Defer     ---------------------------------------------------------------------------------------------------------------------   Allergies:  Azithromycin  ---------------------------------------------------------------------------------------------------------------------   Prior to Admission Medications     Prescriptions Last Dose Informant Patient Reported? Taking?    albuterol sulfate  (90 Base) MCG/ACT inhaler Unknown Pharmacy Yes No    Inhale 1 puff Every 4 (Four) Hours As Needed for Wheezing or Shortness of Air.    amLODIPine (NORVASC) 5 MG tablet Unknown Pharmacy Yes No    Take 5 mg by mouth Daily.    armodafinil (Nuvigil) 150 MG tablet Unknown Pharmacy No No    Take 1 tablet by mouth Daily.    clobetasol (TEMOVATE) 0.05 % cream Unknown Pharmacy Yes No    Apply 1 application topically to the appropriate area as directed Daily.    diazePAM (VALIUM) 10 MG tablet  Unknown Pharmacy Yes No    Take 10 mg by mouth 2 (Two) Times a Day As Needed for Anxiety.    gabapentin (NEURONTIN) 600 MG tablet Unknown Pharmacy Yes No    Take 600 mg by mouth 2 (Two) Times a Day.    HYDROcodone-acetaminophen (NORCO) 7.5-325 MG per tablet Unknown Pharmacy Yes No    Take 1 tablet by mouth 2 (Two) Times a Day As Needed for Mild Pain  or Moderate Pain .    hydrOXYzine (ATARAX) 25 MG tablet Unknown Pharmacy Yes No    Take 25 mg by mouth Every Night.    IVERMECTIN PO Unknown Pharmacy Yes No    Take 3 mg by mouth Daily.    ketoconazole (NIZORAL) 2 % shampoo Unknown Pharmacy Yes No    Apply 1 application topically to the appropriate area as directed Daily.    metoprolol succinate XL (TOPROL-XL) 25 MG 24 hr tablet Unknown Pharmacy Yes No    Take 25 mg by mouth 2 (two) times a day.    mupirocin (BACTROBAN) 2 % ointment Unknown Pharmacy No No    Apply  topically to the appropriate area as directed 3 (Three) Times a Day.        Hospital Meds:  atropine sulfate, , ,   calcium gluconate, 1 g, Intravenous, Once  cefepime, 2 g, Intravenous, Q12H  dextrose, 50 mL, Intravenous, Once   And  insulin regular, 10 Units, Intravenous, Once  influenza vaccine, 0.5 mL, Intramuscular, Once  ipratropium, 0.5 mg, Nebulization, 4x Daily - RT  sodium chloride, 10 mL, Intravenous, Q12H  vancomycin, 1,000 mg, Intravenous, Q24H      heparin (porcine), 12 Units/kg/hr, Last Rate: 12 Units/kg/hr (11/06/21 9367)  Pharmacy Consult - Pharmacy to dose,   Pharmacy Consult - Pharmacy to dose,       ---------------------------------------------------------------------------------------------------------------------   Vital Signs:  Temp:  [97.7 °F (36.5 °C)-98.5 °F (36.9 °C)] 98.5 °F (36.9 °C)  Heart Rate:  [] 57  Resp:  [12-27] 18  BP: ()/(49-82) 134/64      11/05/21  1637 11/06/21  0500   Weight: 46.7 kg (103 lb) 46.8 kg (103 lb 1.6 oz)     Body mass index is 20.14  kg/m².  ---------------------------------------------------------------------------------------------------------------------   Physical exam:   Constitutional: Frail chronically ill-appearing lady with mild respiratory distress.      HENT:  Head: Normocephalic and atraumatic.  Mouth:  Moist mucous membranes.    Eyes:  Conjunctivae and EOM are normal.  Pupils are equal, round, and reactive to light.  No scleral icterus.  Neck:  Neck supple.  No JVD present.    Cardiovascular: S1-S2 with systolic murmur.  Pulmonary/Chest: Diminished air entry on the right side minimal crackles.  Abdominal:  Soft.  Bowel sounds are normal.  No distension and no tenderness.   Musculoskeletal:  No edema, no tenderness, and no deformity.  No red or swollen joints anywhere.    Neurological: Somnolent arousable she is moving her extremities appears mildly confused.   Skin:  Skin is warm and dry.  Both feet are cold  Psychiatric: Appropriate mood  Peripheral vascular: No pulses felt bilaterally in lower extremities, both extremities are cold, dusky    ---------------------------------------------------------------------------------------------------------------------   EKG: Sinus bradycardia with T wave inversions in inferior and anterolateral leads telemetry: Sinus bradycardia intermittently with junctional rhythm  I have personally looked at both the EKG and the telemetry strips.  Echo:  Results for orders placed during the hospital encounter of 11/05/21    Adult Transthoracic Echo Complete W/ Cont if Necessary Per Protocol    Interpretation Summary  · Estimated left ventricular EF = 65%  · Left ventricular wall thickness is consistent with moderate eccentric hypertrophy.  · Diastolic function: Unable to assess due to atrial arrhythmia Left atrial pressure elevated based on E/e' ratio  · The right ventricular cavity is borderline dilated.  · The right atrial cavity is dilated.  · The left atrial cavity is borderline dilated.  · Estimated  right ventricular systolic pressure from tricuspid regurgitation is moderately elevated (45-55 mmHg).  · Mild aortic valve regurgitation is present  · Mild mitral valve regurgitation is present.  · The inferior vena cava is dilated. IVC inspiratory collapse is absent.  · There is no evidence of pericardial effusion. .    ---------------------------------------------------------------------------------------------------------------------   Results from last 7 days   Lab Units 11/06/21  1558 11/06/21  1331 11/06/21  0500 11/05/21  1720 11/05/21  1640   CRP mg/dL  --   --   --  10.34*  --    LACTATE mmol/L 1.7  --   --  2.0  --    WBC 10*3/mm3  --   --  22.25*  --  20.12*   HEMOGLOBIN g/dL  --   --  13.7  --  15.1   HEMATOCRIT %  --   --  48.8*  --  51.1*   MCV fL  --   --  97.6*  --  93.6   MCHC g/dL  --   --  28.1*  --  29.5*   PLATELETS 10*3/mm3  --   --  159  --  172   INR   --  1.49*  --   --   --      Results from last 7 days   Lab Units 11/05/21  2201   PH, ARTERIAL pH units 7.234*   PO2 ART mm Hg 49.2*   PCO2, ARTERIAL mm Hg 80.8*   HCO3 ART mmol/L 34.1*     Results from last 7 days   Lab Units 11/06/21  1558 11/06/21  0500 11/05/21  1720   SODIUM mmol/L 138 142 141   POTASSIUM mmol/L 6.0* 5.1 4.9   MAGNESIUM mg/dL  --   --  2.3   CHLORIDE mmol/L 103 103 100   CO2 mmol/L 25.1 28.0 31.7*   BUN mg/dL 38* 37* 46*   CREATININE mg/dL 1.12* 0.84 1.11*   EGFR IF NONAFRICN AM mL/min/1.73 48* 67 49*   CALCIUM mg/dL 8.2* 8.1* 8.5*   PHOSPHORUS mg/dL  --   --  2.8   GLUCOSE mg/dL 119* 138* 110*   ALBUMIN g/dL  --  3.42* 3.39*   BILIRUBIN mg/dL  --  1.2 1.5*   ALK PHOS U/L  --  156* 161*   AST (SGOT) U/L  --  153* 260*   ALT (SGPT) U/L  --  544* 686*   Estimated Creatinine Clearance: 35 mL/min (A) (by C-G formula based on SCr of 1.12 mg/dL (H)).  Ammonia   Date Value Ref Range Status   11/05/2021 21 11 - 51 umol/L Final     Results from last 7 days   Lab Units 11/05/21 2005 11/05/21  1720   CK TOTAL U/L  --  55   TROPONIN  T ng/mL 0.126* 0.147*     Results from last 7 days   Lab Units 11/05/21  1720   PROBNP pg/mL 4,091.0*     No results found for: HGBA1C  Lab Results   Component Value Date    TSH 7.420 (H) 11/05/2021    FREET4 0.71 (L) 11/05/2021     No results found for: PREGTESTUR, PREGSERUM, HCG, HCGQUANT  Pain Management Panel     Pain Management Panel Latest Ref Rng & Units 11/17/2020 6/18/2017    AMPHETAMINES SCREEN, URINE Negative Negative Negative    BARBITURATES SCREEN Negative Negative Negative    BENZODIAZEPINE SCREEN, URINE Negative Positive(A) Positive(A)    BUPRENORPHINEUR Negative Negative Negative    COCAINE SCREEN, URINE Negative Negative Negative    METHADONE SCREEN, URINE Negative Negative Negative        Blood Culture   Date Value Ref Range Status   11/05/2021 No growth at 24 hours  Preliminary   11/05/2021 No growth at 24 hours  Preliminary     No results found for: URINECX  No results found for: WOUNDCX  No results found for: STOOLCX        ---------------------------------------------------------------------------------------------------------------------   Imaging Results (Last 7 Days)     Procedure Component Value Units Date/Time    US Arterial Doppler Lower Extremity Bilateral [[id number]] Collected: 11/06/21 1109     Updated: 11/06/21 1111    Narrative:        US Arterial Low Ext Bilat    Clinical history: No pulse on Doppler of left lower extremity. History of lung carcinoma.    Comparison: No pertinent prior study    Findings:    The right lower extremity demonstrates elevated velocities with plaque formation throughout the major arteries of the right lower extremity. There is no flow demonstrated in the distal posterior tibial artery.    The left lower extremity demonstrates prominent plaque formation throughout the major arteries. Monophasic flow is demonstrated. Decreased flow velocities are seen throughout the left lower extremity. The posterior tibial demonstrates 8 cm/s flow.      Impression:       Impression:    1.  Elevated velocities in the right lower extremity with plaque formation throughout the major arteries. No flow is demonstrated in the distal posterior tibial artery.    2.  Diminished velocities demonstrated throughout the left lower extremity with monophasic flow seen throughout. Minimal flow demonstrated to the posterior tibial artery.    Signer Name: Rodrigue Yoo MD   Signed: 11/6/2021 11:09 AM   Workstation Name: [facility]    [facility]    US Venous Doppler Lower Extremity Bilateral (duplex) [[id number]] Collected: 11/06/21 0947     Updated: 11/06/21 0949    Narrative:      US Veins LE Duplex BILAT    HISTORY:   History of lung carcinoma. Chronic respiratory failure with hypoxia.    TECHNIQUE:   Real-time ultrasound was performed of both lower extremities utilizing spectral and color Doppler with compression and augmentation techniques.    COMPARISON:  None available.    FINDINGS:  Right Lower Extremity:  There is no deep venous thrombus seen in the right lower extremity, including the right common femoral veins, right femoral veins and right popliteal veins.  Normal compressibility and respiratory phasicity was visualized.  No calf vein thrombus. Greater  saphenous vein is patent.    Left Lower Extremity:  There is no deep venous thrombus seen in the left lower extremity, including the left common femoral veins, left femoral veins and left popliteal veins.   Normal compressibility and respiratory phasicity was visualized.  No calf vein thrombus. Greater  saphenous vein is patent.        Impression:      No deep venous thrombus seen in either lower extremity.    Signer Name: Rodrigue Yoo MD   Signed: 11/6/2021 9:47 AM   Workstation Name: [facility]    [facility]    CT Chest Pulmonary Embolism [[id number]] Collected: 11/05/21 2031     Updated: 11/05/21 2033    Narrative:      CT CHEST PULMONARY EMBOLISM W CONTRAST    INDICATION:   Shortness  of breath, lung cancer    TECHNIQUE:   CT angiogram of the chest with IV contrast. 3-D reconstructions were obtained and reviewed.   Radiation dose reduction techniques included automated exposure control or exposure modulation based on body size. Count of known CT and cardiac nuc med studies  performed in previous 12 months: 0.     COMPARISON:   CT of the chest from 7/6/2021    FINDINGS:   There is narrowing of the patient's right main pulmonary artery and upper lobar branches. This in part appears to be secondary to tumor burden. It is also uncertain if there may be some potential chronic thrombus attached to the artery wall narrowing the  lumen. Findings are consistent with progressive tumor burden throughout the right lung with increased right pleural effusion which now appears moderate to large in size, potentially malignant. There appears to be a broken line in the patient's right  jugular vein. No acute osseous abnormality.      Impression:      1. There is narrowing of the patient's right main pulmonary artery and upper lobar branches. This in part appears to be secondary to tumor burden. It is also uncertain if there may be some potential chronic thrombus attached to the artery wall narrowing  the lumen. This was discussed with Dr. Mcpherson at the time of dictation.  2. Findings are consistent with progressive tumor burden throughout the right lung with increased right pleural effusion which now appears moderate to large in size, potentially malignant.  3. There appears to be a broken line in the patient's right jugular vein    Signer Name: Susana Villagomez MD   Signed: 11/5/2021 8:31 PM   Workstation Name: [id number]    [facility]    CT Abdomen Pelvis With Contrast [[id number]] Collected: 11/05/21 2023     Updated: 11/05/21 2026    Narrative:      CT Abdomen Pelvis W    INDICATION:   History of lung carcinoma. Onset of weakness, shortness of breath and abdominal pain today    TECHNIQUE:    CT of the abdomen and pelvis with contrast. Coronal and sagittal reconstructions were obtained.  Radiation dose reduction techniques included automated exposure control or exposure modulation based on body size. Radiation audit for number of CT and  nuclear cardiology exams performed in the last year: 5.      COMPARISON:   July 6, 2021    FINDINGS:  There is a large right-sided pleural effusion with extensive atelectasis in the right lung base. This has developed in the interval from 7/6/2021.    The heart size is enlarged. Trace amount of pericardial fluid. Small hiatal hernia.    Abdomen: The liver shows normal enhancement. No focal hepatic lesions. The gallbladder is surgically absent. Small amount of free fluid surrounds the liver.    The kidneys demonstrate an extrarenal pelvis on the left side. No obvious renal masses. No adrenal abnormalities.    The aorta and demonstrates extensive atherosclerotic change with aneurysmal dilatation. The infrarenal aorta measures approximately 2.6 cm in greatest diameter. No retroperitoneal adenopathy.    No pancreatic masses. The spleen appears unremarkable.    Pelvis: The bladder is moderately distended. Subcutaneous soft tissues show evidence of anasarca.    The bowel pattern is nonobstructive. No free air. Regional osseous structures show no acute or aggressive bone lesions.      Impression:        1.  Large right-sided pleural effusion with extensive atelectasis in the right lung base developing from prior study of 7/6/2021.    2.  Small amount of free fluid surrounding the lateral aspect of the liver. No focal hepatic lesions.    3.  2.6 cm infrarenal aortic aneurysm.    4.  Interval development of anasarca within the subcutaneous fat of the abdomen and pelvis.    5.  Cardiomegaly with trace amount of pericardial fluid.          Signer Name: Rodrigue Yoo MD   Signed: 11/5/2021 8:23 PM   Workstation Name: [id number]    [facility]    CT Head  Without Contrast [[id number]] Collected: 11/05/21 2014     Updated: 11/05/21 2016    Narrative:      CT Head WO: 11/5/2021 8:41 PM    HISTORY:   Weakness, shortness of breath, lung cancer    TECHNIQUE:   Axial unenhanced head CT. Radiation dose reduction techniques included automated exposure control or exposure modulation based on body size. Radiation audit for number of CT and nuclear cardiology exams performed in the last year: 0.      COMPARISON:   CT of the head from 11/17/2020    FINDINGS:   No intracranial hemorrhage, mass, or infarct. No hydrocephalus or extra-axial fluid collection. The skull base, calvarium, and extracranial soft tissues are normal.      Impression:      No acute intracranial abnormality.          Signer Name: Susana Villagomez MD   Signed: 11/5/2021 8:14 PM   Workstation Name: [id number]    [facility]    XR Chest 1 View [[id number]] Collected: 11/05/21 1742     Updated: 11/05/21 1744    Narrative:      CR Chest 1 Vw    INDICATION:   Generalized weakness    COMPARISON:    Chest x-ray from 1/31/2017    FINDINGS:  Portable AP view(s) of the chest. Left-sided Port-A-Cath is seen with tip in the SVC. Increased hazy opacity throughout the patient's right lung field suggests increased pleural effusion. Spiculated lesions at the patient's right lung apex are partially  visualized. No pneumothorax.      Impression:      Findings appear consistent with increased right pleural effusion which appears to be partially layered. Spiculated lesions throughout the patient's right lung consistent with malignancy are partially visualized.    Signer Name: Susana Villagomez MD   Signed: 11/5/2021 5:42 PM   Workstation Name: [id number]    [facility]        ----------------------------------------------------------------------------------------------------------------------  Assessment:   Atrial flutter currently in sinus bradycardia  Pulmonary hypertension  Ischemic lower  extremity  Squamous cell lung cancer with recurrent metastatic disease  Malignant pleural effusion        Plan:   Ideally we should proceed with lower extremity angiogram and get her evaluated with vascular surgery.  With her concurrent comorbidities and significant metastatic lung disease and malignant pleural effusion, in addition to being a DNI and DO NOT RESUSCITATE, it is prudent that we continue IV heparin.  Discussed this with her sister.  Discussed in detail with the hospitalist.  Sister wishes the patient to be comfort measures and the sister does not want any  further work-up.  We will continue IV heparin for now  Thank you for the consult.    Odalys Murrieta MD  11/06/21  20:09 EDT

## 2021-11-07 NOTE — PLAN OF CARE
Goal Outcome Evaluation:  Plan of Care Reviewed With: patient        Progress: declining  Outcome Summary: Pt alert, confused. Pt started to vomit blood continously.  Gave pt zofran which did not help so, phenergan rectum was ordered.  Radha consulted family and they have decided for pt to be comfort measures.  Heparin drip was DC.  Sister at bedside.  Call light in reach. Bed low, locked, and alarmed.  Will continue to monitor 7p-7a.

## 2021-11-08 NOTE — CASE MANAGEMENT/SOCIAL WORK
Case Management Discharge Note      Final Note: Patient  this AM 21.         Selected Continued Care - Discharged on 2021 Admission date: 2021 - Discharge disposition:         Final Discharge Disposition Code: 20 -

## 2021-11-08 NOTE — PLAN OF CARE
Goal Outcome Evaluation:  Plan of Care Reviewed With: patient, sibling        Progress: declining  Outcome Summary: pt not alert. responds to touch. PRN pain meds given x1. O2 91% with oxygen. HR staying in 30s, called sister to make aware of patient decline.

## 2021-11-09 NOTE — PAYOR COMM NOTE
"CONTACT:  FREDA MCGARRY MSN, APRN  UTILIZATION MANAGEMENT DEPT.  [facility]  [address], [address]  PHONE:  [phone]  FAX: [phone]    PATIENT  ON 21, AWAITING AUTHORIZATION DETERMINATION    REFERENCE # [id number]    Sobeida Hirsch (Dcsd. Female)             Date of Birth Social Security Number Address Home Phone MRN    1950  [address] [facility] [id number] [phone] [id number]    Taoism Marital Status             [facility]        Admission Date Admission Type Admitting Provider Attending Provider Department, Room/Bed    21 Emergency Mari Gonzalez DO Srinivas, Priyanka, MD [address], [address]/    Discharge Date Discharge Disposition Discharge Destination          2021               Attending Provider: Chey Tripp MD    Allergies: Azithromycin    Isolation: None   Infection: None   Code Status: Prior   Advance Care Planning Activity    Ht: 152.4 cm (60\")   Wt: 59.1 kg (130 lb 3.2 oz)    Admission Cmt: None   Principal Problem: Recurrent non-small cell lung cancer (HCC) [C34.90]                 Active Insurance as of 2021     Primary Coverage     Payor Plan Insurance Group Employer/Plan Group    ANTHEM MEDICARE REPLACEMENT ANTHEM MEDICARE ADVANTAGE [id number]     Payor Plan Address Payor Plan Phone Number Payor Plan Fax Number Effective Dates    [address] [phone]  2021 - None Entered    [facility] [address]       Subscriber Name Subscriber Birth Date Member ID       SOBEIDA HIRSCH 1950 [id number]           Secondary Coverage     Payor Plan Insurance Group Employer/Plan Group    [id number]      Payor Plan Address Payor Plan Phone Number Payor Plan Fax Number Effective Dates    [address]   2020 - None Entered    [facility] [address]       Subscriber Name Subscriber Birth Date Member ID       SOBEIDA HIRSCH 1950 [id number]           "       Emergency Contacts      (Rel.) Home Phone Work Phone Mobile Phone    Candace Fischer (Sister) [phone] -- [phone]            Discharge Summary    No notes of this type exist for this encounter.

## 2021-11-10 LAB
BACTERIA SPEC AEROBE CULT: NORMAL
BACTERIA SPEC AEROBE CULT: NORMAL

## 2021-11-12 LAB
QT INTERVAL: 344 MS
QTC INTERVAL: 413 MS

## 2021-11-19 NOTE — DISCHARGE SUMMARY
[facility] HOSPITALISTS DISCHARGE SUMMARY    Patient Identification:  Name:  Sobeida Light  Age:  71 y.o.  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 female presented to [facility] complaining of generalized weakness.  Please see the admitting history and physical for further details.      Patient was admitted to the hospitalist service for further evaluation and treatment for sepsis secondary to postobstructive pneumonia with acute hypoxic hypercapnic respiratory failure and large right-sided pleural effusion likely malignant in the setting of recurrent metastatic squamous cell carcinoma of the lung.  Patient with significant hypercapnia at admission requiring upward titration of oxygen to 15 L bubble high flow.  Patient was noted to have a moderate to large right pleural effusion that was new compared to previous imaging in 2021.  It was felt the patient would potentially benefit from thoracentesis however there was significant risk for lack of reexpansion as the chronicity of her pleural effusion was unclear at the time and appeared to have a  notable rind along the atelectatic inferior portion of the lung.  Ultimately patient and family did not wish to undergo thoracentesis.  She was started on broad-spectrum coverage with vancomycin and cefepime.  CT with PE protocol showed a questionable chronic thrombus of the right main pulmonary artery as well as tumor burden likely causing some compression of the artery.  Because of the patient was initially placed on heparin gtt. however developed hematemesis and this had to be stopped.  Patient also noted to have very poor circulation of the lower extremities with no palpable pulses concerning for significant peripheral arterial disease with progressive skin changes concerning for occlusive PAD.  A CTA angio of the abdominal aorta bilaterally with iliofemoral runoff showed multilevel significant peripheral vascular disease left greater than right with proximal left common iliac artery occluded and high-grade focal narrowing involving the left femoral and popliteal artery with significant continuation of the trifurcation vessels at the level of the ankle.  There is also noted high-grade multifocal narrowing involving the right superficial femoral artery and popliteal artery with what appears to be some attenuation of the trifurcation above the level of the ankle.  Noted also in addition is a high-grade nearly occlusive narrowing at the origin of the SMA and mild infrarenal abdominal aortic aneurysm with significant intramural plaque.  Given this information after being seen by cardiology who recommended that his and further treatment intervention was warranted would need transfer to higher level of care tertiary hospital with vascular surgery family declined and wished to transition to comfort measures given these new findings as well as her above worsening squamous cell carcinoma of the lung with associated complications.  Patient also prior to this and at presentation found to be in new onset atrial fibrillation  with intermittent flutter that required infusion of Cardizem at 15 mg an hour before spontaneously converting after being refractory initially to intravenous digoxin as well as cardioversion in the emergency department.  Ultimately unfortunately due to patient significant disease burden and complications as noted above in accordance with her wishes of transitioning to comfort measures patient did ultimately have worsening hypoxemia and then bradycardia before ultimately expiring the morning of  at 0357.    VITAL SIGNS:        on   ;        Body mass index is 25.43 kg/m².  Wt Readings from Last 3 Encounters:   21 59.1 kg (130 lb 3.2 oz)   21 60.1 kg (132 lb 6.4 oz)   21 60.2 kg (132 lb 12.8 oz)         DISCHARGE DISPOSITION       TEST  RESULTS PENDING AT DISCHARGE       CODE STATUS  Code Status and Medical Interventions:   Ordered at: 21 2019     Level Of Support Discussed With:    Next of Kin (If No Surrogate)     Code Status (Patient has no pulse and is not breathing):    No CPR (Do Not Attempt to Resuscitate)     Medical Interventions (Patient has pulse or is breathing):    Comfort Measures     Comments:    discussed with patient's sister, Candace Fischer, over the phone       Jaycob Eldridge DO  [facility] Hospitalist  21  21:14 EST    Please note that this discharge summary required more than 30 minutes to complete.

## 2021-11-24 ENCOUNTER — APPOINTMENT (OUTPATIENT)
Dept: CT IMAGING | Facility: HOSPITAL | Age: 71
End: 2021-11-24